# Patient Record
Sex: FEMALE | Race: WHITE | NOT HISPANIC OR LATINO | Employment: FULL TIME | URBAN - METROPOLITAN AREA
[De-identification: names, ages, dates, MRNs, and addresses within clinical notes are randomized per-mention and may not be internally consistent; named-entity substitution may affect disease eponyms.]

---

## 2017-02-18 ENCOUNTER — HOSPITAL ENCOUNTER (EMERGENCY)
Facility: HOSPITAL | Age: 52
Discharge: HOME | End: 2017-02-19
Attending: EMERGENCY MEDICINE
Payer: COMMERCIAL

## 2017-02-18 DIAGNOSIS — S91.109A OPEN WOUND OF TOE, INITIAL ENCOUNTER: Primary | ICD-10-CM

## 2017-02-18 DIAGNOSIS — L97.509 TYPE 2 DIABETES MELLITUS WITH FOOT ULCER, WITHOUT LONG-TERM CURRENT USE OF INSULIN: ICD-10-CM

## 2017-02-18 DIAGNOSIS — E11.621 TYPE 2 DIABETES MELLITUS WITH FOOT ULCER, WITHOUT LONG-TERM CURRENT USE OF INSULIN: ICD-10-CM

## 2017-02-18 PROCEDURE — 82962 GLUCOSE BLOOD TEST: CPT

## 2017-02-18 PROCEDURE — 99284 EMERGENCY DEPT VISIT MOD MDM: CPT | Mod: 25

## 2017-02-18 PROCEDURE — 25000003 PHARM REV CODE 250: Performed by: EMERGENCY MEDICINE

## 2017-02-18 RX ORDER — MUPIROCIN 20 MG/G
OINTMENT TOPICAL 3 TIMES DAILY
Qty: 30 G | Refills: 0 | Status: SHIPPED | OUTPATIENT
Start: 2017-02-18 | End: 2017-02-28

## 2017-02-18 RX ORDER — AMOXICILLIN AND CLAVULANATE POTASSIUM 875; 125 MG/1; MG/1
1 TABLET, FILM COATED ORAL 2 TIMES DAILY
Qty: 14 TABLET | Refills: 0 | Status: SHIPPED | OUTPATIENT
Start: 2017-02-18 | End: 2017-02-28

## 2017-02-18 RX ADMIN — BACITRACIN, NEOMYCIN, POLYMYXIN B 1 EACH: 400; 3.5; 5 OINTMENT TOPICAL at 11:02

## 2017-02-18 NOTE — ED AVS SNAPSHOT
[facility]  [address] [place] [address]               Jamila Xiong   2017 10:30 PM   ED    Description:  Female : 1965   Department:  [facility]           Your Care was Coordinated By:     Provider Role From To    Odilia Shankar MD Attending Provider 17 [id number] --      Reason for Visit     Foot Pain           Diagnoses this Visit        Comments    Open wound of toe, initial encounter    -  Primary     Type 2 diabetes mellitus with foot ulcer, without long-term current use of insulin           ED Disposition     ED Disposition Condition Comment    Discharge             To Do List           Follow-up Information     Follow up with David Webb DPM In 3 days.    Specialty:  Podiatry    Contact information:    [address] [place] [address]  [phone]        [facility]     [facility] -  Assistance  Registered nurses in the [facility] provide clinical advisement, health education, appointment booking, and other advisory services.  Call for this free service at [phone].             Medications           Message regarding Medications     Verify the changes and/or additions to your medication regime listed below are the same as discussed with your clinician today.  If any of these changes or additions are incorrect, please notify your healthcare provider.        These medications were administered today        Dose Freq    neomycin-bacitracnZn-polymyxnB packet 1 each 1 packet ED 1 Time    Sig: Apply 1 each topically ED 1 Time.    Class: Normal    Route: Topical (Top)           Verify that the below list of medications is an accurate representation of the medications you are currently taking.  If none reported, the list may be blank. If incorrect, please contact your healthcare provider. Carry this list with you in case of emergency.           Current Medications     atenolol  "(TENORMIN) 50 MG tablet Take 50 mg by mouth once daily.    glipiZIDE (GLUCOTROL) 5 MG TR24 TK 1 T PO  D    LYRICA 200 mg Cap TK ONE C PO  D    pravastatin (PRAVACHOL) 80 MG tablet TK 1 T PO QD    triamterene-hydrochlorothiazide 75-50 mg (MAXZIDE) 75-50 mg per tablet            Clinical Reference Information           Your Vitals Were     BP Pulse Temp Resp Height Weight    183/78 (BP Location: Right arm, Patient Position: Sitting) 77 98.7 °F (37.1 °C) (Oral) 20 5' 6.5" (1.689 m) 90.7 kg (200 lb)    Last Period SpO2 BMI          02/06/2013 100% 31.8 kg/m2        Allergies as of 2/18/2017     No Known Allergies      Immunizations Administered on Date of Encounter - 2/18/2017     None      ED Micro, Lab, POCT     Start Ordered       Status Ordering Provider    02/18/17 2239 02/18/17 2238  POCT glucose  Once      Acknowledged     02/18/17 2232 02/18/17 2231  POCT glucose  Once      Acknowledged       ED Imaging Orders     Start Ordered       Status Ordering Provider    02/18/17 2239 02/18/17 2238  X-Ray Toe 2 or more views  1 time imaging      In process         Discharge Instructions       Return for worsening wound, foot or toe swelling or pain, fever or further concerns.  Followup with podiatrist this week, preferably on Monday or Tuesday.  Stay off your foot and elevate.      Discharge References/Attachments     DIABETES: KEEPING FEET HEALTHY (ENGLISH)    DIABETIC FOOT ULCERS, DISCHARGE INSTRUCTIONS (ENGLISH)    INSPECTING YOUR FEET (DIABETES), STEP-BY-STEP (ENGLISH)       [facility] complies with applicable Federal civil rights laws and does not discriminate on the basis of race, color, national origin, age, disability, or sex.        Language Assistance Services     ATTENTION: Language assistance services are available, free of charge. Please call [phone].      ATENCIÓN: Si habla chelsi, tiene a portillo disposición servicios gratuitos de asistencia lingüística. Llame al " [phone].     CAMRON Ý: N?u b?n nói Ti?ng Vi?t, có các d?ch v? h? tr? ngôn ng? mi?n phí dành cho b?n. G?i s? [phone].

## 2017-02-19 VITALS
TEMPERATURE: 99 F | DIASTOLIC BLOOD PRESSURE: 78 MMHG | RESPIRATION RATE: 20 BRPM | HEART RATE: 77 BPM | HEIGHT: 67 IN | BODY MASS INDEX: 31.39 KG/M2 | OXYGEN SATURATION: 100 % | SYSTOLIC BLOOD PRESSURE: 183 MMHG | WEIGHT: 200 LBS

## 2017-02-19 NOTE — DISCHARGE INSTRUCTIONS
Return for worsening wound, foot or toe swelling or pain, fever or further concerns.  Followup with podiatrist this week, preferably on Monday or Tuesday.  Stay off your foot and elevate.

## 2017-02-19 NOTE — ED PROVIDER NOTES
"Encounter Date: 2017       History     Chief Complaint   Patient presents with    Foot Pain     reports "sore" to 2nd toe on right foot for one day     Review of patient's allergies indicates:  No Known Allergies  HPI Comments: This is a 50 y/o F with history of NIDDM who presents to the ED c/o bleeding from the plantar surface of the 2nd toe since this evening.  Reports she was on her feet all day today and noted blood when she removed her tennis shoes.  Denies pain in the area but has history of diabetic neuropathy and does not have good sensation of some of her toes.  No fever or chills. Compliant with meds.  Denies recent illness or hyperglycemia. Had visit with internist within the past week and foot exam was done, everything was ok at the time.     Past Medical History   Diagnosis Date    Abnormal Pap smear     Hypertension      Past Medical History Pertinent Negatives   Diagnosis Date Noted    Herpes simplex without mention of complication 2013     Past Surgical History   Procedure Laterality Date     section      Tubal ligation      Dilation and curettage of uterus       Family History   Problem Relation Age of Onset    Diabetes Father     Hypertension Father     Diabetes Mother     Hypertension Mother     Ovarian cancer Mother     Cancer Mother     Hypertension Brother     Cancer Sister      skin     Social History   Substance Use Topics    Smoking status: Never Smoker    Smokeless tobacco: Not on file    Alcohol use Yes      Comment: socially     Review of Systems   Constitutional: Negative for fever.   HENT: Negative for sore throat.    Respiratory: Negative for shortness of breath.    Cardiovascular: Negative for chest pain.   Gastrointestinal: Negative for nausea.   Genitourinary: Negative for dysuria.   Musculoskeletal: Negative for back pain.   Skin: Positive for wound. Negative for rash.   Neurological: Negative for weakness.   Hematological: Does not bruise/bleed " easily.   All other systems reviewed and are negative.      Physical Exam   Initial Vitals   BP Pulse Resp Temp SpO2   02/18/17 2229 02/18/17 2229 02/18/17 2229 02/18/17 2229 02/18/17 2229   183/78 77 20 98.7 °F (37.1 °C) 100 %     Physical Exam    Nursing note and vitals reviewed.  Constitutional: She appears well-developed and well-nourished.   HENT:   Head: Normocephalic and atraumatic.   Eyes: Conjunctivae and EOM are normal. Pupils are equal, round, and reactive to light. Right eye exhibits no discharge. Left eye exhibits no discharge. No scleral icterus.   Neck: Normal range of motion. Neck supple.   Cardiovascular: Normal rate, regular rhythm and normal heart sounds. Exam reveals no gallop and no friction rub.    No murmur heard.  Pulses:       Dorsalis pedis pulses are 2+ on the right side, and 2+ on the left side.        Posterior tibial pulses are 2+ on the left side.   Pulmonary/Chest: Breath sounds normal. No stridor. No respiratory distress. She has no wheezes. She has no rhonchi. She has no rales.   Abdominal: Soft. Bowel sounds are normal. She exhibits no distension and no mass. There is no tenderness. There is no rebound and no guarding.   Musculoskeletal:        Right ankle: Normal. Normal range of motion. Ankle sensation: normal.        Left ankle: Normal. Normal range of motion. Ankle sensation: normal.        Right foot: Right 2nd toe: Exhibits bleeding and ecchymosis. Plantar foot sensation: decreased. Comments: Equally warm both sides.      Plantar aspect of R 2nd digit with 1 cm round skin breakdown with ulceration with surrounding ecchymosis         Left foot: Plantar foot sensation: decreased.   Neurological: She is alert and oriented to person, place, and time. She has normal strength. No cranial nerve deficit or sensory deficit.   Skin: Skin is warm and dry.   Psychiatric: She has a normal mood and affect. Her behavior is normal. Judgment and thought content normal.         ED Course    Procedures  Labs Reviewed   POCT GLUCOSE MONITORING CONTINUOUS   POCT GLUCOSE MONITORING CONTINUOUS             Medical Decision Making:   Initial Assessment:   2nd digit skin breakdown in diabetic with neuropathy.   Will xray r/o fracture and check blood sugar.  No evidence of infection, not swollen, no erythema.  Brisk capillary refill and no leg swelling, pulses equal and foot is well perfused.  If xray negative will place on abx and refer for outpatient podiatry within a few days.                    ED Course     Clinical Impression:   The primary encounter diagnosis was Open wound of toe, initial encounter. A diagnosis of Type 2 diabetes mellitus with foot ulcer, without long-term current use of insulin was also pertinent to this visit.    Disposition:   Disposition: Discharged  Condition: Stable       Odilia Shankar MD  02/19/17 0148

## 2017-02-19 NOTE — ED TRIAGE NOTES
Pt presents to ED c/o right foot, second toe ulcer that she noticed approx 30 minutes ago. States she walked a lot today, wearing her regular tennis shoes and does not remember injuring toe or hitting/stubbing it any where. Pt just noticed bleeding when taking her shoe off. On arrival, small open ulcer to right second toe with bruising to toe. Pt has numbness with decreased sensation to second and third toe. Hx of diabetes and hx of neuropathy.

## 2017-02-20 ENCOUNTER — TELEPHONE (OUTPATIENT)
Dept: PODIATRY | Facility: CLINIC | Age: 52
End: 2017-02-20

## 2017-02-20 LAB — POCT GLUCOSE: 170 MG/DL (ref 70–110)

## 2017-02-20 NOTE — TELEPHONE ENCOUNTER
----- Message from Samson Garrett sent at 2/20/2017  1:52 PM CST -----  Contact: self   Pt was seen in the ER on Saturday 2-18-17 for a sore on her foot, she was told to f/u Dr. Webb  with Podiatry within 3 days to a week.  Pt has a sore on her foot ans she's also type 2 diabetic.  Dr Webb is booked until March 10th.  Can you please call pt back to discuss adding her on [phone]

## 2017-02-24 ENCOUNTER — OFFICE VISIT (OUTPATIENT)
Dept: PODIATRY | Facility: CLINIC | Age: 52
End: 2017-02-24
Payer: COMMERCIAL

## 2017-02-24 VITALS
BODY MASS INDEX: 31.86 KG/M2 | WEIGHT: 203 LBS | DIASTOLIC BLOOD PRESSURE: 76 MMHG | SYSTOLIC BLOOD PRESSURE: 134 MMHG | HEART RATE: 67 BPM | HEIGHT: 67 IN | RESPIRATION RATE: 18 BRPM | TEMPERATURE: 98 F

## 2017-02-24 DIAGNOSIS — E11.42 DIABETIC POLYNEUROPATHY ASSOCIATED WITH TYPE 2 DIABETES MELLITUS: ICD-10-CM

## 2017-02-24 DIAGNOSIS — L97.512 TOE ULCER, RIGHT, WITH FAT LAYER EXPOSED: ICD-10-CM

## 2017-02-24 DIAGNOSIS — E11.49 TYPE II DIABETES MELLITUS WITH NEUROLOGICAL MANIFESTATIONS: Primary | ICD-10-CM

## 2017-02-24 DIAGNOSIS — B35.3 TINEA PEDIS OF BOTH FEET: ICD-10-CM

## 2017-02-24 PROCEDURE — 3060F POS MICROALBUMINURIA REV: CPT | Mod: S$GLB,,, | Performed by: PODIATRIST

## 2017-02-24 PROCEDURE — 3046F HEMOGLOBIN A1C LEVEL >9.0%: CPT | Mod: S$GLB,,, | Performed by: PODIATRIST

## 2017-02-24 PROCEDURE — 2022F DILAT RTA XM EVC RTNOPTHY: CPT | Mod: S$GLB,,, | Performed by: PODIATRIST

## 2017-02-24 PROCEDURE — 1160F RVW MEDS BY RX/DR IN RCRD: CPT | Mod: S$GLB,,, | Performed by: PODIATRIST

## 2017-02-24 PROCEDURE — 11042 DBRDMT SUBQ TIS 1ST 20SQCM/<: CPT | Mod: S$GLB,,, | Performed by: PODIATRIST

## 2017-02-24 PROCEDURE — 99999 PR PBB SHADOW E&M-EST. PATIENT-LVL III: CPT | Mod: PBBFAC,,, | Performed by: PODIATRIST

## 2017-02-24 PROCEDURE — 99204 OFFICE O/P NEW MOD 45 MIN: CPT | Mod: 25,S$GLB,, | Performed by: PODIATRIST

## 2017-02-24 RX ORDER — ECONAZOLE NITRATE 10 MG/G
CREAM TOPICAL 2 TIMES DAILY
Qty: 85 G | Refills: 1 | Status: SHIPPED | OUTPATIENT
Start: 2017-02-24 | End: 2019-10-07

## 2017-02-24 RX ORDER — EZETIMIBE 10 MG/1
TABLET ORAL
Refills: 0 | COMMUNITY
Start: 2017-02-16 | End: 2019-04-09

## 2017-02-24 NOTE — PROGRESS NOTES
"Subjective:      Patient ID: Jamila Xiong is a 51 y.o. female.    Chief Complaint: PCP (KATIA BERNAL 2/17); Diabetic Foot Exam; and Foot Problem (Rt 2nd toe blister )    Jamila is a 51 y.o. female who presents to the clinic for evaluation and treatment of high risk feet. Jamila has a past medical history of Abnormal Pap smear and Hypertension. The patient's chief complaint is diabetic foot ulcer, right 2nd toe / this began after working her normal shift at [employer] she remove dher sock and noted a bleeding blister . This patient has documented high risk feet requiring routine maintenance secondary to diabetes mellitis and those secondary complications of diabetes, as mentioned..  History of Trauma: negative  Sign of Infection: none    No results found for: HGBA1C      Review of Systems   Constitution: Negative for chills, decreased appetite and fever.   Cardiovascular: Negative for leg swelling.   Musculoskeletal: Negative for arthritis, joint pain, joint swelling and myalgias.   Gastrointestinal: Negative for nausea and vomiting.   Neurological: Negative for loss of balance, numbness and paresthesias.           Objective:       Vitals:    02/24/17 0901   BP: 134/76   Pulse: 67   Resp: 18   Temp: 97.5 °F (36.4 °C)   TempSrc: Oral   Weight: 92.1 kg (203 lb)   Height: 5' 6.5" (1.689 m)   PainSc: 0-No pain        Physical Exam   Constitutional: She is oriented to person, place, and time. She appears well-developed and well-nourished.   Cardiovascular:   Pulses:       Dorsalis pedis pulses are 2+ on the right side, and 2+ on the left side.        Posterior tibial pulses are 2+ on the right side, and 2+ on the left side.   Musculoskeletal: She exhibits no edema or tenderness.        Right ankle: Normal.        Left ankle: Normal.        Right foot: There is no swelling, no crepitus and no deformity.        Left foot: There is no swelling, no crepitus and no deformity.   Adequate joint range of motion without pain, " limitation, nor crepitation Bilateral feet and ankle joints. Muscle strength is 5/5 in all groups bilaterally.         Feet:   Right Foot:   Protective Sensation: 10 sites tested. 9 sites sensed.   Left Foot:   Protective Sensation: 10 sites tested. 9 sites sensed.   Lymphadenopathy:   No palpable lymph nodes   Neurological: She is alert and oriented to person, place, and time. She has normal strength.   Skin: Skin is warm and dry. No rash noted. No erythema. Nails show no clubbing.   Ulcer location: plantar R 2nd toe   Measurements: 0.8x0.8x0.7 cm   Signs of infection: none  Drainage: none  Periwound: intact  Base: granular/fibrotic 80/20     Psychiatric: She has a normal mood and affect. Her behavior is normal.   Nursing note and vitals reviewed.            Assessment:       Encounter Diagnoses   Name Primary?    Type II diabetes mellitus with neurological manifestations Yes    Toe ulcer, right, with fat layer exposed     Diabetic polyneuropathy associated with type 2 diabetes mellitus     Tinea pedis of both feet          Plan:       Jamila was seen today for pcp, diabetic foot exam and foot problem.    Diagnoses and all orders for this visit:    Type II diabetes mellitus with neurological manifestations    Toe ulcer, right, with fat layer exposed    Diabetic polyneuropathy associated with type 2 diabetes mellitus    Tinea pedis of both feet    Other orders  -     econazole nitrate 1 % cream; Apply topically 2 (two) times daily.      I counseled the patient on her conditions, their implications and medical management.    Shoe inspection. Diabetic Foot Education. Patient reminded of the importance of good nutrition and blood sugar control to help prevent podiatric complications of diabetes. Patient instructed on proper foot hygeine. We discussed wearing proper shoe gear, daily foot inspections, never walking without protective shoe gear, never putting sharp instruments to feet    Econazole 1% topical cream  prescribed for treatment of aforementioned tinea pedis. Patient will use this medication as directed in addition to thourougly drying between toes daily, and applying powder as needed    Advised the patient that fungus likes warm, dark, and moist environments such as bathrooms, gyms, and pools. Advised to spray shower, shower mat , and any shoes w/ Lysol periodically    Wound 2/2 blister formation, will initiate football therapy            WOUND DEBRIDEMENT OPERATIVE REPORT     SURGEON: Sammie Estes DPM     PRE-OP DIAGNOSIS: DIABETIC NEUROTROPHIC FOOT ULCER right      POST-OP DIAGNOSIS: SAME     PROCEDURE: EXCISIONAL ULCER DEBRIDEMENT THROUGH SKIN AND SUBCUTANEOUS TISSUE    ANESTHESIA: Absent protective threshold therefore none required.     EBL: minimal     PROCEDURE IN DETAIL:    After obtaining verbal consent a Time out was performed to verify patient and site, the surgical site was prepped with alcohol prep.     Attention was directed to the r foot where an ulceration was noted on plantar 2nd toe.     Utilizing a No 15 scalpel , I debrided the wound full-thickness through skin, subcutaneous tissue excise viable and non-viable tissue outside the wound margins.    Tissue removed included skin, subcutaneous tissue, callus. Post-debridement, the wound was 100% beefy red  granulation tissue, mildly bleeding, without callus nor fibrin noted. Bleeding was well-controlled with direct pressure. Patient procedure tolerated well.    Pre debridement measurements : 0.8x0.7x0.1 cm   Post debridement measurements: 0.8x0.8x0.7 cm      Dressings:tacho   Offloading:kylie Jones MA assisted w/ application of football dressing under my direct supervision. Darco shoe applied to offload the area. Advised patient that this should be worn on the affected foot at all times when ambulating       Follow-up:Patient is to return to the clinic in one week for follow-up but should call [facility] immediately if any signs of  infection, such as fever, chills, sweats, increased redness or pain.    Short-term goals include maintaining good offloading and minimizing bioburden, promoting granulation and epithelialization to healing.  Long-term goals include keeping the wound healed by good offloading and medical management under the direction of internist.          .

## 2017-02-24 NOTE — LETTER
February 24, 2017      Odilia Shankar MD  [address] [facility] [address]           [facility] - Podiatry  [address]  [place] [address]  Phone: [phone]          Patient: Jamila Xiong   MR Number: [id number]   YOB: 1965   Date of Visit: 2/24/2017       Dear Dr. Odilia Shankar:    Thank you for referring Jamila Xiong to me for evaluation. Attached you will find relevant portions of my assessment and plan of care.    If you have questions, please do not hesitate to call me. I look forward to following Jamila Xiong along with you.    Sincerely,    Sammie Hickman, JENNIFER    Enclosure  CC:  No Recipients    If you would like to receive this communication electronically, please contact [email or web address] or [phone] to request more information on [employer] access.    For providers and/or their staff who would like to refer a patient to [facility], please contact us through our one-stop-shop provider referral line, [facility], at [phone].    If you feel you have received this communication in error or would no longer like to receive these types of communications, please e-mail [email or web address]

## 2017-02-24 NOTE — LETTER
February 24, 2017      Jamila Xiong  [address]  [place] [address]             [facility] - Podiatry  [address]  [facility] [address]  Phone: [phone] Patient: Jamila Xiong   MRN: [id number]  YOB: 1965  Date of Visit: 02/24/2017    To Whom It May Concern:    Jamila was seen in the Podiatry Clinic on 02/24/2017. Due to a right foot condition she must remain off her feet until further notice. She will remain out of work until resolution of her acute R foot condition.  If you have any questions or concerns, or if I can be of further assistance, please do not hesitate to contact my office.    Sincerely,          Sammie Hickman DPM  Podiatry Clinic

## 2017-03-02 ENCOUNTER — OFFICE VISIT (OUTPATIENT)
Dept: PODIATRY | Facility: CLINIC | Age: 52
End: 2017-03-02
Payer: COMMERCIAL

## 2017-03-02 VITALS
WEIGHT: 203 LBS | RESPIRATION RATE: 18 BRPM | HEIGHT: 67 IN | HEART RATE: 63 BPM | TEMPERATURE: 98 F | SYSTOLIC BLOOD PRESSURE: 151 MMHG | DIASTOLIC BLOOD PRESSURE: 87 MMHG | BODY MASS INDEX: 31.86 KG/M2

## 2017-03-02 DIAGNOSIS — E11.49 TYPE II DIABETES MELLITUS WITH NEUROLOGICAL MANIFESTATIONS: Primary | ICD-10-CM

## 2017-03-02 DIAGNOSIS — L97.512 TOE ULCER, RIGHT, WITH FAT LAYER EXPOSED: ICD-10-CM

## 2017-03-02 PROCEDURE — 1160F RVW MEDS BY RX/DR IN RCRD: CPT | Mod: S$GLB,,, | Performed by: PODIATRIST

## 2017-03-02 PROCEDURE — 3046F HEMOGLOBIN A1C LEVEL >9.0%: CPT | Mod: S$GLB,,, | Performed by: PODIATRIST

## 2017-03-02 PROCEDURE — 3060F POS MICROALBUMINURIA REV: CPT | Mod: S$GLB,,, | Performed by: PODIATRIST

## 2017-03-02 PROCEDURE — 2022F DILAT RTA XM EVC RTNOPTHY: CPT | Mod: S$GLB,,, | Performed by: PODIATRIST

## 2017-03-02 PROCEDURE — 99999 PR PBB SHADOW E&M-EST. PATIENT-LVL III: CPT | Mod: PBBFAC,,, | Performed by: PODIATRIST

## 2017-03-02 PROCEDURE — 99213 OFFICE O/P EST LOW 20 MIN: CPT | Mod: S$GLB,,, | Performed by: PODIATRIST

## 2017-03-02 NOTE — LETTER
March 2, 2017      Jamila Xiong  [address]  [place] [address]             [facility] - Podiatry  [address]  [place] [address]  Phone: [phone] Patient: Jamila Xiong   MRN: [id number]  YOB: 1965  Date of Visit: 03/02/2017    To Whom It May Concern:    Jamila was seen in the Podiatry Clinic on 03/2/2017. Due to a right foot condition she must remain off her feet until further notice. She will remain out of work until resolution of her acute R foot condition.  If you have any questions or concerns, or if I can be of further assistance, please do not hesitate to contact my office.      Sincerely,          Sammie Hickman DPM  Podiatry Clinic  [facility]

## 2017-03-02 NOTE — PROGRESS NOTES
"Subjective:      Patient ID: Jamila Xiong is a 51 y.o. female.    Chief Complaint: Follow-up (toe wound ); Foot Ulcer; and Dressing Change    Jamila is a 51 y.o. female who presents to the clinic for evaluation and treatment of high risk feet. Jamila has a past medical history of Abnormal Pap smear and Hypertension. The patient's chief complaint is diabetic foot ulcer, right 2nd toe / this began after working her normal shift at [employer] she remove dher sock and noted a bleeding blister . This patient has documented high risk feet requiring routine maintenance secondary to diabetes mellitis and those secondary complications of diabetes, as mentioned..    3-2-17: Patient has been in football dressing, ambulating in Darco shoe x 1 week with out issues     History of Trauma: negative  Sign of Infection: none    No results found for: HGBA1C      Review of Systems   Constitution: Negative for chills, decreased appetite and fever.   Cardiovascular: Negative for leg swelling.   Musculoskeletal: Negative for arthritis, joint pain, joint swelling and myalgias.   Gastrointestinal: Negative for nausea and vomiting.   Neurological: Negative for loss of balance, numbness and paresthesias.           Objective:       Vitals:    03/02/17 1137   BP: (!) 151/87   Pulse: 63   Resp: 18   Temp: 97.9 °F (36.6 °C)   TempSrc: Oral   Weight: 92.1 kg (203 lb)   Height: 5' 6.5" (1.689 m)   PainSc: 0-No pain        Physical Exam   Constitutional: She is oriented to person, place, and time. She appears well-developed and well-nourished.   Cardiovascular:   Pulses:       Dorsalis pedis pulses are 2+ on the right side, and 2+ on the left side.        Posterior tibial pulses are 2+ on the right side, and 2+ on the left side.   Musculoskeletal: She exhibits no edema or tenderness.        Right foot: There is no deformity.        Left foot: There is no deformity.   Adequate joint range of motion without pain, limitation, nor crepitation " Bilateral feet and ankle joints. Muscle strength is 5/5 in all groups bilaterally.         Feet:   Right Foot:   Protective Sensation: 10 sites tested. 9 sites sensed.   Left Foot:   Protective Sensation: 10 sites tested. 9 sites sensed.   Lymphadenopathy:   No palpable lymph nodes   Neurological: She is alert and oriented to person, place, and time. She has normal strength.   Skin: Skin is warm and dry. No rash noted. No erythema. Nails show no clubbing.   Ulcer location: plantar R 2nd toe   Measurements: 0.5x0.4x0.2 cm   Signs of infection: none  Drainage: none  Periwound: intact  Base: granular     Psychiatric: She has a normal mood and affect. Her behavior is normal.   Nursing note and vitals reviewed.            Assessment:       Encounter Diagnoses   Name Primary?    Type II diabetes mellitus with neurological manifestations Yes    Toe ulcer, right, with fat layer exposed          Plan:       Jamila was seen today for follow-up, foot ulcer and dressing change.    Diagnoses and all orders for this visit:    Type II diabetes mellitus with neurological manifestations    Toe ulcer, right, with fat layer exposed      I counseled the patient on her conditions, their implications and medical management.    Debridement:area deeply cleansed with saline moistened gauze     Dressings:tacho  Offloading:kylie Jones MA assisted w/ application of football dressing under my direct supervision. Darco shoe applied to offload the area. Advised patient that this should be worn on the affected foot at all times when ambulating       Follow-up:Patient is to return to the clinic in one week for follow-up but should call [facility] immediately if any signs of infection, such as fever, chills, sweats, increased redness or pain.    Short-term goals include maintaining good offloading and minimizing bioburden, promoting granulation and epithelialization to healing.  Long-term goals include keeping the wound healed by good  offloading and medical management under the direction of internist.

## 2017-03-09 ENCOUNTER — OFFICE VISIT (OUTPATIENT)
Dept: PODIATRY | Facility: CLINIC | Age: 52
End: 2017-03-09
Payer: COMMERCIAL

## 2017-03-09 VITALS
BODY MASS INDEX: 31.71 KG/M2 | WEIGHT: 202 LBS | HEART RATE: 69 BPM | DIASTOLIC BLOOD PRESSURE: 87 MMHG | HEIGHT: 67 IN | SYSTOLIC BLOOD PRESSURE: 144 MMHG | RESPIRATION RATE: 18 BRPM

## 2017-03-09 DIAGNOSIS — L97.512 TOE ULCER, RIGHT, WITH FAT LAYER EXPOSED: ICD-10-CM

## 2017-03-09 DIAGNOSIS — E11.49 TYPE II DIABETES MELLITUS WITH NEUROLOGICAL MANIFESTATIONS: Primary | ICD-10-CM

## 2017-03-09 PROCEDURE — 99999 PR PBB SHADOW E&M-EST. PATIENT-LVL III: CPT | Mod: PBBFAC,,, | Performed by: PODIATRIST

## 2017-03-09 PROCEDURE — 3060F POS MICROALBUMINURIA REV: CPT | Mod: S$GLB,,, | Performed by: PODIATRIST

## 2017-03-09 PROCEDURE — 3046F HEMOGLOBIN A1C LEVEL >9.0%: CPT | Mod: S$GLB,,, | Performed by: PODIATRIST

## 2017-03-09 PROCEDURE — 1160F RVW MEDS BY RX/DR IN RCRD: CPT | Mod: S$GLB,,, | Performed by: PODIATRIST

## 2017-03-09 PROCEDURE — 99213 OFFICE O/P EST LOW 20 MIN: CPT | Mod: S$GLB,,, | Performed by: PODIATRIST

## 2017-03-09 PROCEDURE — 2022F DILAT RTA XM EVC RTNOPTHY: CPT | Mod: S$GLB,,, | Performed by: PODIATRIST

## 2017-03-09 NOTE — LETTER
March 9, 2017      Jamila Xiong  [address]  [place] [address]             [facility] - Podiatry  [address]  [place] [address]  Phone: [phone] Patient: Jamila Xiong   MRN: [id number]  YOB: 1965  Date of Visit: 03/09/2017    To Whom It May Concern:      Jamila was seen in the Podiatry Clinic on 03/9/2017. Due to a right foot condition she must remain off her feet until further notice. She will remain out of work until resolution of her acute R foot condition.  If you have any questions or concerns, or if I can be of further assistance, please do not hesitate to contact my office.    Sincerely,          Sammie Hickman DPM  Podiatry Clinic  [facility]

## 2017-03-09 NOTE — PROGRESS NOTES
"Subjective:      Patient ID: Jamila Xiong is a 51 y.o. female.    Chief Complaint: Follow-up (wound care toe ); Foot Ulcer (toe ); and Dressing Change    Jamila is a 51 y.o. female who presents to the clinic for evaluation and treatment of high risk feet. Jamila has a past medical history of Abnormal Pap smear and Hypertension. The patient's chief complaint is diabetic foot ulcer, right 2nd toe / this began after working her normal shift at [employer] she remove dher sock and noted a bleeding blister . This patient has documented high risk feet requiring routine maintenance secondary to diabetes mellitis and those secondary complications of diabetes, as mentioned..    3-9-17: Patient has been in football dressing, ambulating in Darco shoe x 1 week with out issues     History of Trauma: negative  Sign of Infection: none    No results found for: HGBA1C      Review of Systems   Constitution: Negative for chills, decreased appetite and fever.   Cardiovascular: Negative for leg swelling.   Skin: Positive for dry skin.   Musculoskeletal: Negative for arthritis, joint pain, joint swelling and myalgias.   Gastrointestinal: Negative for nausea and vomiting.   Neurological: Negative for loss of balance, numbness and paresthesias.           Objective:       Vitals:    03/09/17 1149   BP: (!) 144/87   Pulse: 69   Resp: 18   Weight: 91.6 kg (202 lb)   Height: 5' 6.5" (1.689 m)   PainSc:   2   PainLoc: Toe        Physical Exam   Constitutional: She is oriented to person, place, and time. She appears well-developed and well-nourished.   Cardiovascular:   Pulses:       Dorsalis pedis pulses are 2+ on the right side, and 2+ on the left side.        Posterior tibial pulses are 2+ on the right side, and 2+ on the left side.   Musculoskeletal: She exhibits no edema or tenderness.        Right foot: There is no deformity.        Left foot: There is no deformity.   Adequate joint range of motion without pain, limitation, nor " crepitation Bilateral feet and ankle joints. Muscle strength is 5/5 in all groups bilaterally.         Feet:   Right Foot:   Protective Sensation: 10 sites tested. 9 sites sensed.   Left Foot:   Protective Sensation: 10 sites tested. 9 sites sensed.   Lymphadenopathy:   No palpable lymph nodes   Neurological: She is alert and oriented to person, place, and time. She has normal strength.   Skin: Skin is warm and dry. No rash noted. No erythema. Nails show no clubbing.   Ulcer location: plantar R 2nd toe   Measurements: 0.2x0.4x0.1 cm   Signs of infection: none  Drainage: none  Periwound: intact  Base: granular     Psychiatric: She has a normal mood and affect. Her behavior is normal.   Nursing note and vitals reviewed.            Assessment:       Encounter Diagnoses   Name Primary?    Type II diabetes mellitus with neurological manifestations Yes    Toe ulcer, right, with fat layer exposed          Plan:       Jamila was seen today for follow-up, foot ulcer and dressing change.    Diagnoses and all orders for this visit:    Type II diabetes mellitus with neurological manifestations    Toe ulcer, right, with fat layer exposed    I counseled the patient on her conditions, their implications and medical management.    Wound progressing well , will continue current treatment plan    Debridement:area deeply cleansed with saline moistened gauze     Dressings:tacho  Offloading:kylie Jones MA assisted w/ application of football dressing under my direct supervision. Darco shoe applied to offload the area. Advised patient that this should be worn on the affected foot at all times when ambulating       Follow-up:Patient is to return to the clinic in one week for follow-up but [facility] if any signs of infection, such as fever, chills, sweats, increased redness or pain.    Short-term goals include maintaining good offloading and minimizing bioburden, promoting granulation and epithelialization  to healing.  Long-term goals include keeping the wound healed by good offloading and medical management under the direction of internist.

## 2017-03-16 ENCOUNTER — OFFICE VISIT (OUTPATIENT)
Dept: PODIATRY | Facility: CLINIC | Age: 52
End: 2017-03-16
Payer: COMMERCIAL

## 2017-03-16 VITALS
HEART RATE: 67 BPM | DIASTOLIC BLOOD PRESSURE: 84 MMHG | BODY MASS INDEX: 31.71 KG/M2 | RESPIRATION RATE: 18 BRPM | WEIGHT: 202 LBS | SYSTOLIC BLOOD PRESSURE: 151 MMHG | HEIGHT: 67 IN

## 2017-03-16 DIAGNOSIS — Z87.2 HEALED FOOT ULCER: ICD-10-CM

## 2017-03-16 DIAGNOSIS — E11.49 TYPE II DIABETES MELLITUS WITH NEUROLOGICAL MANIFESTATIONS: Primary | ICD-10-CM

## 2017-03-16 PROCEDURE — 2022F DILAT RTA XM EVC RTNOPTHY: CPT | Mod: S$GLB,,, | Performed by: PODIATRIST

## 2017-03-16 PROCEDURE — 99212 OFFICE O/P EST SF 10 MIN: CPT | Mod: S$GLB,,, | Performed by: PODIATRIST

## 2017-03-16 PROCEDURE — 1160F RVW MEDS BY RX/DR IN RCRD: CPT | Mod: S$GLB,,, | Performed by: PODIATRIST

## 2017-03-16 PROCEDURE — 3046F HEMOGLOBIN A1C LEVEL >9.0%: CPT | Mod: S$GLB,,, | Performed by: PODIATRIST

## 2017-03-16 PROCEDURE — 3060F POS MICROALBUMINURIA REV: CPT | Mod: S$GLB,,, | Performed by: PODIATRIST

## 2017-03-16 PROCEDURE — 99999 PR PBB SHADOW E&M-EST. PATIENT-LVL III: CPT | Mod: PBBFAC,,, | Performed by: PODIATRIST

## 2017-03-16 NOTE — PROGRESS NOTES
"Subjective:      Patient ID: Jamila Xiong is a 51 y.o. female.    Chief Complaint: Follow-up (wound check); Foot Ulcer; and foot ball    Jamila is a 51 y.o. female who presents to the clinic for evaluation and treatment of high risk feet. Jamila has a past medical history of Abnormal Pap smear and Hypertension. The patient's chief complaint is diabetic foot ulcer, right 2nd toe / this began after working her normal shift at [employer] she remove dher sock and noted a bleeding blister . This patient has documented high risk feet requiring routine maintenance secondary to diabetes mellitis and those secondary complications of diabetes, as mentioned..    3-9-17: Patient has been in football dressing, ambulating in Darco shoe x 1 week with out issues   3-16-17 Patient has been in football dressing, ambulating in Darco shoe x 1 week with out issues     History of Trauma: negative  Sign of Infection: none    No results found for: HGBA1C      Review of Systems   Constitution: Negative for chills, decreased appetite and fever.   Cardiovascular: Negative for chest pain, claudication and leg swelling.   Skin: Positive for dry skin.   Musculoskeletal: Negative for arthritis, back pain, falls, joint pain, joint swelling and myalgias.   Gastrointestinal: Negative for nausea and vomiting.   Neurological: Negative for loss of balance, numbness and paresthesias.           Objective:       Vitals:    03/16/17 0915   BP: (!) 151/84   Pulse: 67   Resp: 18   Weight: 91.6 kg (202 lb)   Height: 5' 6.5" (1.689 m)   PainSc:   3   PainLoc: Foot        Physical Exam   Constitutional: She is oriented to person, place, and time. She appears well-developed and well-nourished.   Cardiovascular:   Pulses:       Dorsalis pedis pulses are 2+ on the right side, and 2+ on the left side.        Posterior tibial pulses are 2+ on the right side, and 2+ on the left side.   Musculoskeletal: She exhibits no edema or tenderness.        Right foot: " There is no deformity.        Left foot: There is no deformity.   Adequate joint range of motion without pain, limitation, nor crepitation Bilateral feet and ankle joints. Muscle strength is 5/5 in all groups bilaterally.         Feet:   Right Foot:   Protective Sensation: 10 sites tested. 9 sites sensed.   Left Foot:   Protective Sensation: 10 sites tested. 9 sites sensed.   Lymphadenopathy:   No palpable lymph nodes   Neurological: She is alert and oriented to person, place, and time. She has normal strength.   Skin: Skin is warm and dry. No rash noted. No erythema. Nails show no clubbing.   Ulcer location: plantar R 2nd toe   Measurements:[id number]   Signs of infection: none  Drainage: none  Periwound: intact  Base: epithelial      Psychiatric: She has a normal mood and affect. Her behavior is normal.   Nursing note and vitals reviewed.            Assessment:       Encounter Diagnoses   Name Primary?    Type II diabetes mellitus with neurological manifestations Yes    Healed foot ulcer          Plan:       Jamila was seen today for follow-up, foot ulcer and foot ball.    Diagnoses and all orders for this visit:    Type II diabetes mellitus with neurological manifestations    Healed foot ulcer      I counseled the patient on her conditions, their implications and medical management.    Wound has healed well with no signs of continued skin breakdown noted   Ok to transition into normal shoe gear at this time. I advised patient to check feet daily for signs of drainage or lesion re-opening   Discussed use of daily foot moisturizer to feet, avoiding the webspace's  Limit showers/bathing to roughly 15 minutes during the 1st few weeks after wound has healed to prevent skin breakdown

## 2017-04-06 ENCOUNTER — OFFICE VISIT (OUTPATIENT)
Dept: PODIATRY | Facility: CLINIC | Age: 52
End: 2017-04-06
Payer: COMMERCIAL

## 2017-04-06 VITALS
BODY MASS INDEX: 31.71 KG/M2 | RESPIRATION RATE: 18 BRPM | SYSTOLIC BLOOD PRESSURE: 135 MMHG | HEIGHT: 67 IN | HEART RATE: 62 BPM | DIASTOLIC BLOOD PRESSURE: 85 MMHG | WEIGHT: 202 LBS

## 2017-04-06 DIAGNOSIS — L97.511 TOE ULCER, RIGHT, LIMITED TO BREAKDOWN OF SKIN: ICD-10-CM

## 2017-04-06 DIAGNOSIS — E11.49 TYPE II DIABETES MELLITUS WITH NEUROLOGICAL MANIFESTATIONS: Primary | ICD-10-CM

## 2017-04-06 PROCEDURE — 3046F HEMOGLOBIN A1C LEVEL >9.0%: CPT | Mod: S$GLB,,, | Performed by: PODIATRIST

## 2017-04-06 PROCEDURE — 1160F RVW MEDS BY RX/DR IN RCRD: CPT | Mod: S$GLB,,, | Performed by: PODIATRIST

## 2017-04-06 PROCEDURE — 3060F POS MICROALBUMINURIA REV: CPT | Mod: S$GLB,,, | Performed by: PODIATRIST

## 2017-04-06 PROCEDURE — 99999 PR PBB SHADOW E&M-EST. PATIENT-LVL III: CPT | Mod: PBBFAC,,, | Performed by: PODIATRIST

## 2017-04-06 PROCEDURE — 99213 OFFICE O/P EST LOW 20 MIN: CPT | Mod: S$GLB,,, | Performed by: PODIATRIST

## 2017-04-07 NOTE — PROGRESS NOTES
"Subjective:      Patient ID: Jamila Xiong is a 51 y.o. female.    Chief Complaint: PCP (KATIA BERNAL 2/17); Diabetic Foot Exam; and Foot Ulcer (check on healed ulcer )    Jamila is a 51 y.o. female who presents to the clinic for evaluation and treatment of high risk feet. Jamila has a past medical history of Abnormal Pap smear and Hypertension. The patient's chief complaint is diabetic foot ulcer, right 2nd toe / this began after working her normal shift at [employer] she remove dher sock and noted a bleeding blister . This patient has documented high risk feet requiring routine maintenance secondary to diabetes mellitis and those secondary complications of diabetes, as mentioned..    3-9-17: Patient has been in football dressing, ambulating in Darco shoe x 1 week with out issues   3-16-17 Patient has been in football dressing, ambulating in Darco shoe x 1 week with out issues   4/6/17: pt presents for 3 week follow up. Has resumed work at her second job. Reports small area still present on the toe     History of Trauma: negative  Sign of Infection: none    No results found for: HGBA1C      Review of Systems   Constitution: Negative for chills, decreased appetite and fever.   Cardiovascular: Negative for chest pain, claudication, irregular heartbeat and leg swelling.   Skin: Negative for flushing, itching and poor wound healing.   Musculoskeletal: Negative for arthritis, back pain, falls, joint pain, joint swelling and myalgias.   Gastrointestinal: Negative for nausea and vomiting.   Neurological: Negative for loss of balance, numbness and paresthesias.           Objective:       Vitals:    04/06/17 0859   BP: 135/85   Pulse: 62   Resp: 18   Weight: 91.6 kg (202 lb)   Height: 5' 6.5" (1.689 m)   PainSc: 0-No pain        Physical Exam   Constitutional: She is oriented to person, place, and time. She appears well-developed and well-nourished.   Cardiovascular:   Pulses:       Dorsalis pedis pulses are 2+ on the " right side, and 2+ on the left side.        Posterior tibial pulses are 2+ on the right side, and 2+ on the left side.   Musculoskeletal: She exhibits no edema, tenderness or deformity.        Right foot: There is no deformity.        Left foot: There is no deformity.   Adequate joint range of motion without pain, limitation, nor crepitation Bilateral feet and ankle joints. Muscle strength is 5/5 in all groups bilaterally.         Feet:   Right Foot:   Protective Sensation: 10 sites tested. 9 sites sensed.   Left Foot:   Protective Sensation: 10 sites tested. 9 sites sensed.   Lymphadenopathy:   No palpable lymph nodes   Neurological: She is alert and oriented to person, place, and time. She has normal strength.   Skin: Skin is warm and dry. No rash noted. No erythema. Nails show no clubbing.   Ulcer location: plantar R 2nd toe   Measurements:0.1x0.1x0.1cm   Signs of infection: none  Drainage: none  Periwound: intact  Base: granular     Psychiatric: She has a normal mood and affect. Her behavior is normal.   Nursing note and vitals reviewed.            Assessment:       Encounter Diagnoses   Name Primary?    Type II diabetes mellitus with neurological manifestations Yes    Toe ulcer, right, limited to breakdown of skin          Plan:       Jamila was seen today for pcp, diabetic foot exam and foot ulcer.    Diagnoses and all orders for this visit:    Type II diabetes mellitus with neurological manifestations    Toe ulcer, right, limited to breakdown of skin      I counseled the patient on her conditions, their implications and medical management.    Small pinpoint lesion   Debridement:area deeply cleansed with saline moistened gauze     Dressings:iodosorb   Offloading:mepilex border     Follow-up:Patient is to return to the clinic in 2 weeks for follow-up but [facility] if any signs of infection, such as fever, chills, sweats, increased redness or pain.    Short-term goals include maintaining  good offloading and minimizing bioburden, promoting granulation and epithelialization to healing.  Long-term goals include keeping the wound healed by good offloading and medical management under the direction of internist.

## 2017-10-17 DIAGNOSIS — Z12.39 SCREENING BREAST EXAMINATION: Primary | ICD-10-CM

## 2017-12-12 ENCOUNTER — HOSPITAL ENCOUNTER (OUTPATIENT)
Dept: RADIOLOGY | Facility: HOSPITAL | Age: 52
Discharge: HOME | End: 2017-12-12
Attending: INTERNAL MEDICINE
Payer: COMMERCIAL

## 2017-12-12 DIAGNOSIS — Z12.39 SCREENING BREAST EXAMINATION: ICD-10-CM

## 2017-12-12 PROCEDURE — 77067 SCR MAMMO BI INCL CAD: CPT | Mod: 26,,, | Performed by: RADIOLOGY

## 2017-12-12 PROCEDURE — 77067 SCR MAMMO BI INCL CAD: CPT | Mod: TC

## 2017-12-15 ENCOUNTER — OFFICE VISIT (OUTPATIENT)
Dept: OBSTETRICS AND GYNECOLOGY | Facility: CLINIC | Age: 52
End: 2017-12-15
Payer: COMMERCIAL

## 2017-12-15 VITALS
WEIGHT: 193.13 LBS | DIASTOLIC BLOOD PRESSURE: 80 MMHG | SYSTOLIC BLOOD PRESSURE: 126 MMHG | BODY MASS INDEX: 30.31 KG/M2 | HEIGHT: 67 IN

## 2017-12-15 DIAGNOSIS — Z01.419 WELL WOMAN EXAM WITH ROUTINE GYNECOLOGICAL EXAM: ICD-10-CM

## 2017-12-15 DIAGNOSIS — Z12.4 ROUTINE PAPANICOLAOU SMEAR: ICD-10-CM

## 2017-12-15 PROCEDURE — 88175 CYTOPATH C/V AUTO FLUID REDO: CPT

## 2017-12-15 PROCEDURE — 99999 PR PBB SHADOW E&M-EST. PATIENT-LVL II: CPT | Mod: PBBFAC,,, | Performed by: OBSTETRICS & GYNECOLOGY

## 2017-12-15 PROCEDURE — 99396 PREV VISIT EST AGE 40-64: CPT | Mod: S$GLB,,, | Performed by: OBSTETRICS & GYNECOLOGY

## 2017-12-15 NOTE — PROGRESS NOTES
"HPI:   52 y.o.   OB History      Para Term  AB Living    2 2 2     2    SAB TAB Ectopic Multiple Live Births            2       Patient's last menstrual period was 2013.    Patient is  here for her annual gynecologic exam.  She has no complaints.     ROS:  GENERAL: No fever, chills, fatigability or weight loss.  SKIN: No rashes, itching or changes in color or texture of skin.  HEAD: No headaches or recent head trauma.  EYES: Visual acuity fine. No photophobia, ocular pain or diplopia.  EARS: Denies ear pain, discharge or vertigo.  NOSE: No loss of smell, no epistaxis or postnasal drip.  MOUTH & THROAT: No hoarseness or change in voice. No excessive gum bleeding.  NODES: Denies swollen glands.  CHEST: Denies TUBBS, cyanosis, wheezing, cough and sputum production.  CARDIOVASCULAR: Denies chest pain, PND, orthopnea or reduced exercise tolerance.  ABDOMEN: Appetite fine. No weight loss. Denies diarrhea, abdominal pain, hematemesis or blood in stool.  URINARY: No flank pain, dysuria or hematuria.  PERIPHERAL VASCULAR: No claudication or cyanosis.  MUSCULOSKELETAL: No joint stiffness or swelling. Denies back pain.  NEUROLOGIC: No history of seizures, paralysis, alteration of gait or coordination.    PE:   /80   Ht 5' 6.5" (1.689 m)   Wt 87.6 kg (193 lb 2 oz)   LMP 2013   BMI 30.70 kg/m²   APPEARANCE: Well nourished, well developed, in no acute distress.  NECK: Neck symmetric without masses or thyromegaly.  BREASTS: Symmetrical, no skin changes or visible lesions. No palpable masses, nipple discharge or adenopathy bilaterally.  ABDOMEN: Flat. Soft. No tenderness or masses. No hepatosplenomegaly. No hernias. No CVA tenderness.  VULVA: No lesions. Normal female genitalia.  URETHRAL MEATUS: Normal size and location, no lesions, no prolapse.  URETHRA: No masses, tenderness, prolapse or scarring.  VAGINA: Moist and well rugated, no discharge, no significant cystocele or rectocele.  CERVIX: No " lesions and discharge. PAP done.  UTERUS: Normal size, regular shape, mobile, non-tender, bladder base nontender.  ADNEXA: No masses, tenderness or CDS nodularity.  ANUS PERINEUM: Normal.    PROCEDURES:  Pap smear    Assessment:  Normal Gynecologic Exam    Plan:  Mammogram and Colonoscopy if indicated by current recommendations.  Return to clinic in one year or for any problems or complaints.  No gyn co,

## 2018-03-13 RX ORDER — PRAVASTATIN SODIUM 80 MG/1
TABLET ORAL
Qty: 30 TABLET | Refills: 0 | OUTPATIENT
Start: 2018-03-13

## 2018-05-29 ENCOUNTER — OFFICE VISIT (OUTPATIENT)
Dept: PODIATRY | Facility: CLINIC | Age: 53
End: 2018-05-29
Payer: COMMERCIAL

## 2018-05-29 VITALS
BODY MASS INDEX: 30.66 KG/M2 | HEIGHT: 67 IN | SYSTOLIC BLOOD PRESSURE: 121 MMHG | DIASTOLIC BLOOD PRESSURE: 71 MMHG | WEIGHT: 195.38 LBS | HEART RATE: 76 BPM

## 2018-05-29 DIAGNOSIS — E11.49 TYPE II DIABETES MELLITUS WITH NEUROLOGICAL MANIFESTATIONS: Primary | ICD-10-CM

## 2018-05-29 DIAGNOSIS — R23.4 FISSURE IN SKIN OF FOOT: ICD-10-CM

## 2018-05-29 PROCEDURE — 99213 OFFICE O/P EST LOW 20 MIN: CPT | Mod: S$GLB,,, | Performed by: PODIATRIST

## 2018-05-29 PROCEDURE — 99999 PR PBB SHADOW E&M-EST. PATIENT-LVL III: CPT | Mod: PBBFAC,,, | Performed by: PODIATRIST

## 2018-05-29 RX ORDER — METOPROLOL TARTRATE 25 MG/1
25 TABLET, FILM COATED ORAL 2 TIMES DAILY
COMMUNITY

## 2018-05-29 RX ORDER — GABAPENTIN 800 MG/1
800 TABLET ORAL 3 TIMES DAILY
COMMUNITY

## 2018-05-29 NOTE — PROGRESS NOTES
"Subjective:      Patient ID: Jamila Xiong is a 52 y.o. female.    Chief Complaint: Diabetes Mellitus (PCP Dr. Pino, Jan. 2018); Callouses; Diabetic Foot Exam; Routine Foot Care; and Foot Problem (bilateral heel)    Jamila is a 52 y.o. female who presents to the podiatry clinic  with complaint of  bilateral foot pain to b/l heels. Onset of the symptoms was several weeks ago. Precipitating event: none known. Current symptoms include: ability to bear weight, but with some pain. Aggravating factors: walking. Symptoms have gradually worsened. Patient has had prior foot problems.       Review of Systems   Constitution: Negative for chills, decreased appetite and fever.   Cardiovascular: Negative for leg swelling.   Skin: Positive for dry skin.   Musculoskeletal: Negative for arthritis, joint pain, joint swelling and myalgias.   Gastrointestinal: Negative for nausea and vomiting.   Neurological: Negative for loss of balance, numbness and paresthesias.           Objective:       Vitals:    05/29/18 0955   BP: 121/71   Pulse: 76   Weight: 88.6 kg (195 lb 6.4 oz)   Height: 5' 6.5" (1.689 m)   PainSc: 0-No pain        Physical Exam   Constitutional: She is oriented to person, place, and time. She appears well-developed and well-nourished.   Cardiovascular:   Pulses:       Dorsalis pedis pulses are 2+ on the right side, and 2+ on the left side.        Posterior tibial pulses are 2+ on the right side, and 2+ on the left side.   Musculoskeletal: She exhibits no edema or tenderness.        Right ankle: Normal.        Left ankle: Normal.        Right foot: There is no swelling, no crepitus and no deformity.        Left foot: There is no swelling, no crepitus and no deformity.   Adequate joint range of motion without pain, limitation, nor crepitation Bilateral feet and ankle joints. Muscle strength is 5/5 in all groups bilaterally.         Lymphadenopathy:   No palpable lymph nodes   Neurological: She is alert and oriented to person, " place, and time. She has normal strength.   Skin: Skin is warm, dry and intact. No rash noted. No erythema. Nails show no clubbing.   Hyperkeratotic tissue noted to posterior calcaneal rim b/l w/ splitting No surrounding erythema, edema, malodor, nor drainage noted        Psychiatric: She has a normal mood and affect. Her behavior is normal.             Assessment:       Encounter Diagnoses   Name Primary?    Type II diabetes mellitus with neurological manifestations Yes    Fissure in skin of foot          Plan:       Jamila was seen today for diabetes mellitus, callouses, diabetic foot exam, routine foot care and foot problem.    Diagnoses and all orders for this visit:    Type II diabetes mellitus with neurological manifestations    Fissure in skin of foot      I counseled the patient on her conditions, their implications and medical management.      With the patients permission I did  file the hyperkeratotic tissue w/ a mechanical  as a courtesy . Patient tolerated this well    Heel fissures w/o active skin break down     Will order Urea 40 from [employer]

## 2018-07-15 RX ORDER — PRAVASTATIN SODIUM 80 MG/1
TABLET ORAL
Qty: 30 TABLET | Refills: 0 | OUTPATIENT
Start: 2018-07-15

## 2018-11-07 ENCOUNTER — IMMUNIZATION (OUTPATIENT)
Dept: PHARMACY | Facility: CLINIC | Age: 53
End: 2018-11-07
Payer: COMMERCIAL

## 2018-11-07 DIAGNOSIS — Z12.39 SCREENING BREAST EXAMINATION: ICD-10-CM

## 2018-11-07 DIAGNOSIS — M81.0 OSTEOPOROSIS: Primary | ICD-10-CM

## 2018-12-13 ENCOUNTER — TELEPHONE (OUTPATIENT)
Dept: OBSTETRICS AND GYNECOLOGY | Facility: CLINIC | Age: 53
End: 2018-12-13

## 2018-12-13 NOTE — TELEPHONE ENCOUNTER
Pt state her daughter think she have a hemorrhoid internal and it was bleeding for a week but this week hasn't been bleeding. Pt not to sure what it is and is very concern. Pt mother state her daughter is a pt of yours. pt mother wants to could her daughter be seen with you sooner or could tell her what it is. Pt name is Mya Inga. : [birth year]

## 2018-12-13 NOTE — TELEPHONE ENCOUNTER
----- Message from Genesis Brennan sent at 12/13/2018  3:16 PM CST -----  Contact: [phone]  Patient called in requesting to speak with you. Patient prefers to speak with a nurse. Please call.

## 2018-12-14 ENCOUNTER — HOSPITAL ENCOUNTER (OUTPATIENT)
Dept: RADIOLOGY | Facility: HOSPITAL | Age: 53
Discharge: HOME | End: 2018-12-14
Attending: INTERNAL MEDICINE
Payer: COMMERCIAL

## 2018-12-14 DIAGNOSIS — Z12.39 SCREENING BREAST EXAMINATION: ICD-10-CM

## 2018-12-14 DIAGNOSIS — M81.0 OSTEOPOROSIS: ICD-10-CM

## 2018-12-14 PROCEDURE — 77080 DXA BONE DENSITY AXIAL: CPT | Mod: TC

## 2018-12-14 PROCEDURE — 77067 SCR MAMMO BI INCL CAD: CPT | Mod: TC

## 2018-12-14 PROCEDURE — 77067 SCR MAMMO BI INCL CAD: CPT | Mod: 26,,, | Performed by: RADIOLOGY

## 2018-12-14 PROCEDURE — 77063 BREAST TOMOSYNTHESIS BI: CPT | Mod: TC

## 2018-12-14 PROCEDURE — 77063 BREAST TOMOSYNTHESIS BI: CPT | Mod: 26,,, | Performed by: RADIOLOGY

## 2018-12-14 PROCEDURE — 77080 DXA BONE DENSITY AXIAL: CPT | Mod: 26,,, | Performed by: RADIOLOGY

## 2019-04-09 ENCOUNTER — OFFICE VISIT (OUTPATIENT)
Dept: OBSTETRICS AND GYNECOLOGY | Facility: CLINIC | Age: 54
End: 2019-04-09
Payer: COMMERCIAL

## 2019-04-09 VITALS
DIASTOLIC BLOOD PRESSURE: 70 MMHG | HEIGHT: 66 IN | BODY MASS INDEX: 31.46 KG/M2 | WEIGHT: 195.75 LBS | SYSTOLIC BLOOD PRESSURE: 108 MMHG

## 2019-04-09 DIAGNOSIS — Z12.4 ROUTINE PAPANICOLAOU SMEAR: Primary | ICD-10-CM

## 2019-04-09 DIAGNOSIS — Z01.419 WELL WOMAN EXAM WITH ROUTINE GYNECOLOGICAL EXAM: ICD-10-CM

## 2019-04-09 PROCEDURE — 99999 PR PBB SHADOW E&M-EST. PATIENT-LVL III: CPT | Mod: PBBFAC,,, | Performed by: OBSTETRICS & GYNECOLOGY

## 2019-04-09 PROCEDURE — 88175 CYTOPATH C/V AUTO FLUID REDO: CPT

## 2019-04-09 PROCEDURE — 99396 PREV VISIT EST AGE 40-64: CPT | Mod: S$GLB,,, | Performed by: OBSTETRICS & GYNECOLOGY

## 2019-04-09 PROCEDURE — 99396 PR PREVENTIVE VISIT,EST,40-64: ICD-10-PCS | Mod: S$GLB,,, | Performed by: OBSTETRICS & GYNECOLOGY

## 2019-04-09 PROCEDURE — 99999 PR PBB SHADOW E&M-EST. PATIENT-LVL III: ICD-10-PCS | Mod: PBBFAC,,, | Performed by: OBSTETRICS & GYNECOLOGY

## 2019-04-09 NOTE — PROGRESS NOTES
"HPI:   53 y.o.   OB History        2    Para   2    Term   2            AB        Living   2       SAB        TAB        Ectopic        Multiple        Live Births   2              Patient's last menstrual period was 2013.    Patient is  here for her annual gynecologic exam.  She has no complaints.     ROS:  GENERAL: No fever, chills, fatigability or weight loss.  SKIN: No rashes, itching or changes in color or texture of skin.  HEAD: No headaches or recent head trauma.  EYES: Visual acuity fine. No photophobia, ocular pain or diplopia.  EARS: Denies ear pain, discharge or vertigo.  NOSE: No loss of smell, no epistaxis or postnasal drip.  MOUTH & THROAT: No hoarseness or change in voice. No excessive gum bleeding.  NODES: Denies swollen glands.  CHEST: Denies TUBBS, cyanosis, wheezing, cough and sputum production.  CARDIOVASCULAR: Denies chest pain, PND, orthopnea or reduced exercise tolerance.  ABDOMEN: Appetite fine. No weight loss. Denies diarrhea, abdominal pain, hematemesis or blood in stool.  URINARY: No flank pain, dysuria or hematuria.  PERIPHERAL VASCULAR: No claudication or cyanosis.  MUSCULOSKELETAL: No joint stiffness or swelling. Denies back pain.  NEUROLOGIC: No history of seizures, paralysis, alteration of gait or coordination.    PE:   /70   Ht 5' 6" (1.676 m)   Wt 88.8 kg (195 lb 12.3 oz)   LMP 2013   BMI 31.60 kg/m²   APPEARANCE: Well nourished, well developed, in no acute distress.  NECK: Neck symmetric without masses or thyromegaly.  BREASTS: Symmetrical, no skin changes or visible lesions. No palpable masses, nipple discharge or adenopathy bilaterally.  ABDOMEN: Flat. Soft. No tenderness or masses. No hepatosplenomegaly. No hernias. No CVA tenderness.  VULVA: No lesions. Normal female genitalia.  URETHRAL MEATUS: Normal size and location, no lesions, no prolapse.  URETHRA: No masses, tenderness, prolapse or scarring.  VAGINA: Moist and well rugated, no " discharge, no significant cystocele or rectocele.  CERVIX: No lesions and discharge. PAP done.  UTERUS: Normal size, regular shape, mobile, non-tender, bladder base nontender.  ADNEXA: No masses, tenderness or CDS nodularity.  ANUS PERINEUM: Normal.    PROCEDURES:  Pap smear    Assessment:  Normal Gynecologic Exam    Plan:  Mammogram and Colonoscopy if indicated by current recommendations.  Return to clinic in one year or for any problems or complaints.  No gyn co  Had mammo  1/2cm cx polyp no concern

## 2019-06-07 ENCOUNTER — IMMUNIZATION (OUTPATIENT)
Dept: PHARMACY | Facility: CLINIC | Age: 54
End: 2019-06-07

## 2019-06-07 ENCOUNTER — IMMUNIZATION (OUTPATIENT)
Dept: PHARMACY | Facility: CLINIC | Age: 54
End: 2019-06-07
Payer: COMMERCIAL

## 2019-06-12 NOTE — ED NOTES
Health Maintenance Due   Topic Date Due   • Diabetes Eye Exam  [birth year]1971   • Diabetes Urine Microalbumin  [birth year]03/1971   • Hepatitis C Screening  03/03/2004   • Lung Cancer Screening  03/03/2008   • Pneumococcal Vaccine 65+ (2 of 2 - PPSV23) 05/01/2019       Patient is due for topics listed above, she wishes to proceed with Immunization(s) Pneumococcal and Diabetes Urine Microalbumin, but is not proceeding with Hepatitis C Screening at this time. The following has occured:              Radiology at bedside.

## 2019-10-07 ENCOUNTER — OFFICE VISIT (OUTPATIENT)
Dept: PODIATRY | Facility: CLINIC | Age: 54
End: 2019-10-07
Payer: COMMERCIAL

## 2019-10-07 VITALS
DIASTOLIC BLOOD PRESSURE: 79 MMHG | HEART RATE: 82 BPM | HEIGHT: 66 IN | BODY MASS INDEX: 31.34 KG/M2 | SYSTOLIC BLOOD PRESSURE: 138 MMHG | WEIGHT: 195 LBS

## 2019-10-07 DIAGNOSIS — M10.9 ACUTE GOUT INVOLVING TOE OF RIGHT FOOT, UNSPECIFIED CAUSE: ICD-10-CM

## 2019-10-07 DIAGNOSIS — E11.49 TYPE II DIABETES MELLITUS WITH NEUROLOGICAL MANIFESTATIONS: Primary | ICD-10-CM

## 2019-10-07 DIAGNOSIS — B35.3 TINEA PEDIS OF LEFT FOOT: ICD-10-CM

## 2019-10-07 PROCEDURE — 99999 PR PBB SHADOW E&M-EST. PATIENT-LVL III: ICD-10-PCS | Mod: PBBFAC,,, | Performed by: PODIATRIST

## 2019-10-07 PROCEDURE — 99214 OFFICE O/P EST MOD 30 MIN: CPT | Mod: S$GLB,,, | Performed by: PODIATRIST

## 2019-10-07 PROCEDURE — 99999 PR PBB SHADOW E&M-EST. PATIENT-LVL III: CPT | Mod: PBBFAC,,, | Performed by: PODIATRIST

## 2019-10-07 PROCEDURE — 99214 PR OFFICE/OUTPT VISIT, EST, LEVL IV, 30-39 MIN: ICD-10-PCS | Mod: S$GLB,,, | Performed by: PODIATRIST

## 2019-10-07 RX ORDER — INDOMETHACIN 50 MG/1
50 CAPSULE ORAL 2 TIMES DAILY WITH MEALS
Qty: 20 CAPSULE | Refills: 1 | Status: SHIPPED | OUTPATIENT
Start: 2019-10-07 | End: 2019-12-06 | Stop reason: SDUPTHER

## 2019-10-07 RX ORDER — ECONAZOLE NITRATE 10 MG/G
CREAM TOPICAL 2 TIMES DAILY
Qty: 85 G | Refills: 1 | Status: SHIPPED | OUTPATIENT
Start: 2019-10-07 | End: 2019-11-06

## 2019-10-07 NOTE — PROGRESS NOTES
Subjective:      Patient ID: Jamila Xiong is a 53 y.o. female.    Chief Complaint: Diabetic Foot Exam (dr tushar pino 19)    Jamila is a 53 y.o. female who presents to the clinic upon referral from Dr. Last ref. provider found  for evaluation and treatment of diabetic feet. Jamila has a past medical history of Abnormal Pap smear and Hypertension. Patient relates no major problem with feet. Only complaints today consist of yearly comprehensive diabetic  foot examination and r great toe redness .    PCP: Tushar Pino MD    Date Last Seen by PCP:   Chief Complaint   Patient presents with    Diabetic Foot Exam     dr tushar pino 19         Current shoe gear: Casual shoes    No results found for: HGBA1C          Review of Systems   Constitution: Negative for chills, decreased appetite and fever.   Cardiovascular: Negative for leg swelling.   Skin: Positive for dry skin.   Musculoskeletal: Negative for arthritis, joint pain, joint swelling and myalgias.   Gastrointestinal: Negative for nausea and vomiting.   Neurological: Negative for loss of balance, numbness and paresthesias.         There is no problem list on file for this patient.      Current Outpatient Medications on File Prior to Visit   Medication Sig Dispense Refill    gabapentin (NEURONTIN) 800 MG tablet Take 800 mg by mouth 3 (three) times daily.      glipiZIDE (GLUCOTROL) 5 MG TR24 TK 1 T PO  D  4    metoprolol tartrate (LOPRESSOR) 25 MG tablet Take 25 mg by mouth 2 (two) times daily.      pravastatin (PRAVACHOL) 80 MG tablet TK 1 T PO QD  4    triamterene-hydrochlorothiazide 75-50 mg (MAXZIDE) 75-50 mg per tablet       [DISCONTINUED] econazole nitrate 1 % cream Apply topically 2 (two) times daily. 85 g 1     No current facility-administered medications on file prior to visit.        Review of patient's allergies indicates:  No Known Allergies    Past Surgical History:   Procedure Laterality Date     SECTION      DILATION AND CURETTAGE  "OF UTERUS      TUBAL LIGATION         Family History   Problem Relation Age of Onset    Diabetes Father     Hypertension Father     Diabetes Mother     Hypertension Mother     Ovarian cancer Mother     Cancer Mother     Hypertension Brother     Cancer Sister         skin       Social History     Socioeconomic History    Marital status:      Spouse name: Not on file    Number of children: Not on file    Years of education: Not on file    Highest education level: Not on file   Occupational History    Not on file   Social Needs    Financial resource strain: Not on file    Food insecurity:     Worry: Not on file     Inability: Not on file    Transportation needs:     Medical: Not on file     Non-medical: Not on file   Tobacco Use    Smoking status: Never Smoker   Substance and Sexual Activity    Alcohol use: Yes     Comment: socially    Drug use: Not on file    Sexual activity: Yes     Partners: Male   Lifestyle    Physical activity:     Days per week: Not on file     Minutes per session: Not on file    Stress: Not on file   Relationships    Social connections:     Talks on phone: Not on file     Gets together: Not on file     Attends [employer] service: Not on file     Active member of club or organization: Not on file     Attends meetings of clubs or organizations: Not on file     Relationship status: Not on file   Other Topics Concern    Not on file   Social History Narrative    Not on file               Objective:       Vitals:    10/07/19 0827   BP: 138/79   Pulse: 82   Weight: 88.5 kg (195 lb)   Height: 5' 6" (1.676 m)   PainSc:   2        Physical Exam   Constitutional: She appears well-developed and well-nourished.  Non-toxic appearance. She does not have a sickly appearance. No distress.   alert and oriented x 3.    Cardiovascular:   Pulses:       Dorsalis pedis pulses are 2+ on the right side, and 2+ on the left side.        Posterior tibial pulses are 2+ on the right side, and " 2+ on the left side.    Capillary refill time is within normal limits. Digital hair present.    Pulmonary/Chest: No respiratory distress.   Musculoskeletal: She exhibits no deformity.        Right ankle: No tenderness. No lateral malleolus, no medial malleolus, no AITFL, no CF ligament and no posterior TFL tenderness found. Achilles tendon exhibits no pain, no defect and normal Reece's test results.        Left ankle: No tenderness. No lateral malleolus, no medial malleolus, no AITFL, no CF ligament and no posterior TFL tenderness found. Achilles tendon exhibits no pain, no defect and normal Reece's test results.        Right foot: There is no tenderness and no bony tenderness.        Left foot: There is no tenderness and no bony tenderness.   Adequate joint range of motion without pain, limitation, nor crepitation Bilateral feet and ankle joints. Muscle strength is 5/5 in all groups bilaterally.        Tenderness r 1st MPJ w/ erythema and slight increase in warmth    Feet:   Right Foot:   Protective Sensation: 5 sites tested. 5 sites sensed.   Left Foot:   Protective Sensation: 5 sites tested. 5 sites sensed.   Lymphadenopathy:   No lymphatic streaking     Neurological: She displays no atrophy. No sensory deficit.   Light touch present     Skin: Skin is warm, dry and intact. No rash noted. She is not diaphoretic. No cyanosis. No pallor. Nails show no clubbing.   Skin is of normal turgor.   Normal temperature gradient.  Examination of the skin reveals no evidence of significant rashes, open lesions, suspicious appearing nevi or other concerning lesions.      Toenails 1-5 bilaterally are neatly trimmed; of normal color and thickness       Psychiatric: Her mood appears not anxious. Her affect is not inappropriate. Her speech is not slurred. She is not combative. She is communicative. She is attentive.   Nursing note and vitals reviewed.            Assessment:       Encounter Diagnoses   Name Primary?    Type II  diabetes mellitus with neurological manifestations Yes    Acute gout involving toe of right foot, unspecified cause          Plan:       Jamila was seen today for diabetic foot exam.    Diagnoses and all orders for this visit:    Type II diabetes mellitus with neurological manifestations    Acute gout involving toe of right foot, unspecified cause    Other orders  -     econazole nitrate 1 % cream; Apply topically 2 (two) times daily.  -     indomethacin (INDOCIN) 50 MG capsule; Take 1 capsule (50 mg total) by mouth 2 (two) times daily with meals. for 10 days      I counseled the patient on her conditions, their implications and medical management.      - Shoe inspection. Diabetic Foot Education. Patient reminded of the importance of good nutrition and blood sugar control to help prevent podiatric complications of diabetes. Patient instructed on proper foot hygeine. We discussed wearing proper shoe gear, daily foot inspections, never walking without protective shoe gear, caution putting sharp instruments to feet     - Discussed DM foot care:  Wear comfortable, proper fitting shoes. Wash feet daily. Dry well. After drying, apply moisturizer to feet (no lotion to webspaces). Inspect feet daily for skin breaks, blisters, swelling, or redness. Wear cotton socks (preferably white)  Change socks every day. Do NOT walk barefoot. Do NOT use heating pads or warm/hot water soaks     - Discussed importance of daily moisturizer to the feet such as Gold bonds diabetic foot cream    - Patient is low risk for developing lower extremity issues secondary to diabetes. I recommend continued yearly diabetic foot examinations.     - Patients PCP can perform yearly foot checks . Currently  patient has no pedal manifestations of DM    - The nature of gout is fully explained, including dietary relationship, acute and interval phase and treatment of both. Long term complications such as kidney stones, tophi and arthritis are discussed.  Avoidance of alcohol recommended, and written literature is given along with a low purine diet. Indications for the use of allopurinol for prophylaxis and the use of colchicine to prevent or treat flare-ups is also discussed. Proper use of indomethacin for acute attacks discussed, and its side effects. Call if further attacks occur, or this one does not resolve promptly.    - Patients with out pedal manifestations of DM, do not qualify for nail/callus trimming     - RTC in  PRN

## 2019-10-08 ENCOUNTER — TELEPHONE (OUTPATIENT)
Dept: PODIATRY | Facility: CLINIC | Age: 54
End: 2019-10-08

## 2019-10-08 RX ORDER — KETOCONAZOLE 20 MG/G
CREAM TOPICAL DAILY
Qty: 60 G | Refills: 3 | Status: SHIPPED | OUTPATIENT
Start: 2019-10-08 | End: 2021-04-01

## 2019-10-08 NOTE — TELEPHONE ENCOUNTER
----- Message from Romero Lopez sent at 10/8/2019  1:05 PM CDT -----  Contact: [facility]  econazole nitrate 1 % cream is rejecting need to try another medication prior to prescribing the econazole nitrate      Contact info  [phone]

## 2019-10-27 RX ORDER — GLIPIZIDE 5 MG/1
TABLET, FILM COATED, EXTENDED RELEASE ORAL
Qty: 90 TABLET | Refills: 0 | OUTPATIENT
Start: 2019-10-27

## 2019-11-01 RX ORDER — METOPROLOL SUCCINATE 50 MG/1
TABLET, EXTENDED RELEASE ORAL
Qty: 30 TABLET | Refills: 0 | OUTPATIENT
Start: 2019-11-01

## 2019-11-05 RX ORDER — LISINOPRIL AND HYDROCHLOROTHIAZIDE 20; 25 MG/1; MG/1
TABLET ORAL
Qty: 90 TABLET | Refills: 0 | OUTPATIENT
Start: 2019-11-05

## 2019-11-20 ENCOUNTER — IMMUNIZATION (OUTPATIENT)
Dept: PHARMACY | Facility: CLINIC | Age: 54
End: 2019-11-20
Payer: COMMERCIAL

## 2019-12-06 RX ORDER — INDOMETHACIN 50 MG/1
CAPSULE ORAL
Qty: 20 CAPSULE | Refills: 0 | Status: SHIPPED | OUTPATIENT
Start: 2019-12-06 | End: 2021-04-01

## 2020-06-22 ENCOUNTER — HOSPITAL ENCOUNTER (OUTPATIENT)
Dept: RADIOLOGY | Facility: HOSPITAL | Age: 55
Discharge: HOME | End: 2020-06-22
Attending: INTERNAL MEDICINE
Payer: COMMERCIAL

## 2020-06-22 DIAGNOSIS — Z12.31 SCREENING MAMMOGRAM, ENCOUNTER FOR: ICD-10-CM

## 2020-06-22 PROCEDURE — 77063 BREAST TOMOSYNTHESIS BI: CPT | Mod: 26,,, | Performed by: RADIOLOGY

## 2020-06-22 PROCEDURE — 77063 MAMMO DIGITAL SCREENING BILAT WITH TOMOSYNTHESIS_CAD: ICD-10-PCS | Mod: 26,,, | Performed by: RADIOLOGY

## 2020-06-22 PROCEDURE — 77067 SCR MAMMO BI INCL CAD: CPT | Mod: TC

## 2020-06-22 PROCEDURE — 77067 MAMMO DIGITAL SCREENING BILAT WITH TOMOSYNTHESIS_CAD: ICD-10-PCS | Mod: 26,,, | Performed by: RADIOLOGY

## 2020-06-22 PROCEDURE — 77067 SCR MAMMO BI INCL CAD: CPT | Mod: 26,,, | Performed by: RADIOLOGY

## 2020-09-25 ENCOUNTER — OFFICE VISIT (OUTPATIENT)
Dept: OBSTETRICS AND GYNECOLOGY | Facility: CLINIC | Age: 55
End: 2020-09-25
Payer: COMMERCIAL

## 2020-09-25 VITALS
WEIGHT: 197 LBS | BODY MASS INDEX: 31.66 KG/M2 | SYSTOLIC BLOOD PRESSURE: 102 MMHG | DIASTOLIC BLOOD PRESSURE: 70 MMHG | HEIGHT: 66 IN

## 2020-09-25 DIAGNOSIS — Z12.4 SCREENING FOR CERVICAL CANCER: ICD-10-CM

## 2020-09-25 DIAGNOSIS — Z01.419 WELL WOMAN EXAM WITH ROUTINE GYNECOLOGICAL EXAM: Primary | ICD-10-CM

## 2020-09-25 PROCEDURE — 99999 PR PBB SHADOW E&M-EST. PATIENT-LVL III: ICD-10-PCS | Mod: PBBFAC,,, | Performed by: OBSTETRICS & GYNECOLOGY

## 2020-09-25 PROCEDURE — 99396 PREV VISIT EST AGE 40-64: CPT | Mod: S$GLB,,, | Performed by: OBSTETRICS & GYNECOLOGY

## 2020-09-25 PROCEDURE — 88175 CYTOPATH C/V AUTO FLUID REDO: CPT

## 2020-09-25 PROCEDURE — 99396 PR PREVENTIVE VISIT,EST,40-64: ICD-10-PCS | Mod: S$GLB,,, | Performed by: OBSTETRICS & GYNECOLOGY

## 2020-09-25 PROCEDURE — 99999 PR PBB SHADOW E&M-EST. PATIENT-LVL III: CPT | Mod: PBBFAC,,, | Performed by: OBSTETRICS & GYNECOLOGY

## 2020-09-25 RX ORDER — AMITRIPTYLINE HYDROCHLORIDE 25 MG/1
TABLET, FILM COATED ORAL
COMMUNITY
Start: 2020-09-22

## 2020-09-25 RX ORDER — ALLOPURINOL 300 MG/1
300 TABLET ORAL
COMMUNITY
Start: 2020-02-24

## 2020-09-25 RX ORDER — BLOOD-GLUCOSE METER
KIT MISCELLANEOUS
COMMUNITY
Start: 2020-06-30

## 2020-09-25 RX ORDER — MELOXICAM 15 MG/1
15 TABLET ORAL
COMMUNITY
Start: 2020-02-24 | End: 2021-04-01

## 2020-09-25 RX ORDER — LISINOPRIL 40 MG/1
TABLET ORAL
COMMUNITY
Start: 2020-08-18

## 2020-09-25 RX ORDER — EZETIMIBE 10 MG/1
10 TABLET ORAL
COMMUNITY
Start: 2020-02-24

## 2020-09-25 NOTE — PROGRESS NOTES
"HPI:   54 y.o.   OB History        2    Para   2    Term   2            AB        Living   2       SAB        TAB        Ectopic        Multiple        Live Births   2              Patient's last menstrual period was 2013.    Patient is  here for her annual gynecologic exam.  She has no complaints.     ROS:  GENERAL: No fever, chills, fatigability or weight loss.  SKIN: No rashes, itching or changes in color or texture of skin.  HEAD: No headaches or recent head trauma.  EYES: Visual acuity fine. No photophobia, ocular pain or diplopia.  EARS: Denies ear pain, discharge or vertigo.  NOSE: No loss of smell, no epistaxis or postnasal drip.  MOUTH & THROAT: No hoarseness or change in voice. No excessive gum bleeding.  NODES: Denies swollen glands.  CHEST: Denies TUBBS, cyanosis, wheezing, cough and sputum production.  CARDIOVASCULAR: Denies chest pain, PND, orthopnea or reduced exercise tolerance.  ABDOMEN: Appetite fine. No weight loss. Denies diarrhea, abdominal pain, hematemesis or blood in stool.  URINARY: No flank pain, dysuria or hematuria.  PERIPHERAL VASCULAR: No claudication or cyanosis.  MUSCULOSKELETAL: No joint stiffness or swelling. Denies back pain.  NEUROLOGIC: No history of seizures, paralysis, alteration of gait or coordination.    PE:   /70   Ht 5' 6" (1.676 m)   Wt 89.4 kg (197 lb)   LMP 2013   BMI 31.80 kg/m²   APPEARANCE: Well nourished, well developed, in no acute distress.  NECK: Neck symmetric without masses or thyromegaly.  BREASTS: Symmetrical, no skin changes or visible lesions. No palpable masses, nipple discharge or adenopathy bilaterally.  ABDOMEN: Flat. Soft. No tenderness or masses. No hepatosplenomegaly. No hernias. No CVA tenderness.  VULVA: No lesions. Normal female genitalia.  URETHRAL MEATUS: Normal size and location, no lesions, no prolapse.  URETHRA: No masses, tenderness, prolapse or scarring.  VAGINA: Moist and well rugated, no discharge, no " significant cystocele or rectocele.  CERVIX: No lesions and discharge. PAP done.  UTERUS: Normal size, regular shape, mobile, non-tender, bladder base nontender.  ADNEXA: No masses, tenderness or CDS nodularity.  ANUS PERINEUM: Normal.    PROCEDURES:  Pap smear    Assessment:  Normal Gynecologic Exam    Plan:  Mammogram and Colonoscopy if indicated by current recommendations.  Return to clinic in one year or for any problems or complaints.  No bleeding  Small cx polyp

## 2020-10-16 LAB
FINAL PATHOLOGIC DIAGNOSIS: NORMAL
Lab: NORMAL

## 2021-02-26 ENCOUNTER — IMMUNIZATION (OUTPATIENT)
Dept: PRIMARY CARE CLINIC | Facility: CLINIC | Age: 56
End: 2021-02-26
Payer: COMMERCIAL

## 2021-02-26 DIAGNOSIS — Z23 NEED FOR VACCINATION: Primary | ICD-10-CM

## 2021-02-26 PROCEDURE — 0011A COVID-19, MRNA, LNP-S, PF, 100 MCG/0.5 ML DOSE VACCINE: CPT | Mod: PBBFAC | Performed by: EMERGENCY MEDICINE

## 2021-03-17 ENCOUNTER — TELEPHONE (OUTPATIENT)
Dept: INTERVENTIONAL RADIOLOGY/VASCULAR | Facility: HOSPITAL | Age: 56
End: 2021-03-17

## 2021-03-26 ENCOUNTER — IMMUNIZATION (OUTPATIENT)
Dept: PRIMARY CARE CLINIC | Facility: CLINIC | Age: 56
End: 2021-03-26
Payer: COMMERCIAL

## 2021-03-26 DIAGNOSIS — Z23 NEED FOR VACCINATION: Primary | ICD-10-CM

## 2021-03-26 PROCEDURE — 0012A COVID-19, MRNA, LNP-S, PF, 100 MCG/0.5 ML DOSE VACCINE: CPT | Mod: PBBFAC | Performed by: FAMILY MEDICINE

## 2021-04-01 ENCOUNTER — HOSPITAL ENCOUNTER (OUTPATIENT)
Dept: INTERVENTIONAL RADIOLOGY/VASCULAR | Facility: HOSPITAL | Age: 56
Discharge: HOME | End: 2021-04-01
Attending: INTERNAL MEDICINE
Payer: COMMERCIAL

## 2021-04-01 VITALS
SYSTOLIC BLOOD PRESSURE: 167 MMHG | RESPIRATION RATE: 12 BRPM | OXYGEN SATURATION: 96 % | DIASTOLIC BLOOD PRESSURE: 75 MMHG | TEMPERATURE: 97 F | HEART RATE: 77 BPM

## 2021-04-01 DIAGNOSIS — E04.1 NONTOXIC UNINODULAR GOITER: ICD-10-CM

## 2021-04-01 PROCEDURE — 88172 CYTP DX EVAL FNA 1ST EA SITE: CPT | Mod: 26,,, | Performed by: PATHOLOGY

## 2021-04-01 PROCEDURE — 10005 FNA BX W/US GDN 1ST LES: CPT

## 2021-04-01 PROCEDURE — 88173 PR  INTERPRETATION OF FNA SMEAR: ICD-10-PCS | Mod: 26,,, | Performed by: PATHOLOGY

## 2021-04-01 PROCEDURE — 88177 CYTP FNA EVAL EA ADDL: CPT | Performed by: PATHOLOGY

## 2021-04-01 PROCEDURE — 88173 CYTOPATH EVAL FNA REPORT: CPT | Mod: 26,,, | Performed by: PATHOLOGY

## 2021-04-01 PROCEDURE — 25000003 PHARM REV CODE 250: Performed by: RADIOLOGY

## 2021-04-01 PROCEDURE — 88172 CYTP DX EVAL FNA 1ST EA SITE: CPT | Performed by: PATHOLOGY

## 2021-04-01 PROCEDURE — 10005 IR US FINE NEEDLE ASPIRATION BIOPSY, FIRST LESION: ICD-10-PCS | Mod: ,,, | Performed by: RADIOLOGY

## 2021-04-01 PROCEDURE — 10005 FNA BX W/US GDN 1ST LES: CPT | Mod: ,,, | Performed by: RADIOLOGY

## 2021-04-01 PROCEDURE — 88173 CYTOPATH EVAL FNA REPORT: CPT | Performed by: PATHOLOGY

## 2021-04-01 PROCEDURE — 88172 PR  EVALUATION OF FNA SMEAR TO DETERMINE ADEQUACY, FIRST EVAL: ICD-10-PCS | Mod: 26,,, | Performed by: PATHOLOGY

## 2021-04-01 RX ORDER — LIDOCAINE HYDROCHLORIDE 10 MG/ML
INJECTION INFILTRATION; PERINEURAL CODE/TRAUMA/SEDATION MEDICATION
Status: COMPLETED | OUTPATIENT
Start: 2021-04-01 | End: 2021-04-01

## 2021-04-01 RX ORDER — INSULIN GLARGINE 100 [IU]/ML
15 INJECTION, SOLUTION SUBCUTANEOUS DAILY
COMMUNITY

## 2021-04-01 RX ADMIN — LIDOCAINE HYDROCHLORIDE 5 ML: 10 INJECTION, SOLUTION INFILTRATION; PERINEURAL at 10:04

## 2021-04-07 LAB
ADEQUACY: ABNORMAL
FINAL PATHOLOGIC DIAGNOSIS: ABNORMAL
Lab: ABNORMAL

## 2021-06-23 DIAGNOSIS — Z12.31 SCREENING MAMMOGRAM, ENCOUNTER FOR: Primary | ICD-10-CM

## 2021-08-05 ENCOUNTER — HOSPITAL ENCOUNTER (OUTPATIENT)
Dept: RADIOLOGY | Facility: HOSPITAL | Age: 56
Discharge: HOME | End: 2021-08-05
Attending: INTERNAL MEDICINE
Payer: COMMERCIAL

## 2021-08-05 DIAGNOSIS — Z12.31 SCREENING MAMMOGRAM, ENCOUNTER FOR: ICD-10-CM

## 2021-08-05 PROCEDURE — 77067 SCR MAMMO BI INCL CAD: CPT | Mod: 26,,, | Performed by: RADIOLOGY

## 2021-08-05 PROCEDURE — 77067 MAMMO DIGITAL SCREENING BILAT WITH TOMO: ICD-10-PCS | Mod: 26,,, | Performed by: RADIOLOGY

## 2021-08-05 PROCEDURE — 77063 BREAST TOMOSYNTHESIS BI: CPT | Mod: 26,,, | Performed by: RADIOLOGY

## 2021-08-05 PROCEDURE — 77063 MAMMO DIGITAL SCREENING BILAT WITH TOMO: ICD-10-PCS | Mod: 26,,, | Performed by: RADIOLOGY

## 2021-08-05 PROCEDURE — 77067 SCR MAMMO BI INCL CAD: CPT | Mod: TC

## 2021-11-17 ENCOUNTER — IMMUNIZATION (OUTPATIENT)
Dept: PRIMARY CARE CLINIC | Facility: CLINIC | Age: 56
End: 2021-11-17
Payer: COMMERCIAL

## 2021-11-17 DIAGNOSIS — Z23 NEED FOR VACCINATION: Primary | ICD-10-CM

## 2021-11-17 PROCEDURE — 0064A COVID-19, MRNA, LNP-S, PF, 100 MCG/0.25 ML DOSE VACCINE (MODERNA BOOSTER): CPT | Mod: CV19,PBBFAC | Performed by: INTERNAL MEDICINE

## 2021-11-30 ENCOUNTER — OFFICE VISIT (OUTPATIENT)
Dept: OBSTETRICS AND GYNECOLOGY | Facility: CLINIC | Age: 56
End: 2021-11-30
Payer: COMMERCIAL

## 2021-11-30 ENCOUNTER — LAB VISIT (OUTPATIENT)
Dept: LAB | Facility: HOSPITAL | Age: 56
End: 2021-11-30
Attending: OBSTETRICS & GYNECOLOGY
Payer: COMMERCIAL

## 2021-11-30 VITALS — WEIGHT: 198.5 LBS | SYSTOLIC BLOOD PRESSURE: 140 MMHG | DIASTOLIC BLOOD PRESSURE: 80 MMHG | BODY MASS INDEX: 32.04 KG/M2

## 2021-11-30 DIAGNOSIS — N83.209 CYST OF OVARY, UNSPECIFIED LATERALITY: ICD-10-CM

## 2021-11-30 DIAGNOSIS — Z01.419 WELL WOMAN EXAM WITH ROUTINE GYNECOLOGICAL EXAM: Primary | ICD-10-CM

## 2021-11-30 DIAGNOSIS — Z01.419 WELL WOMAN EXAM WITH ROUTINE GYNECOLOGICAL EXAM: ICD-10-CM

## 2021-11-30 LAB — CANCER AG125 SERPL-ACNC: 8 U/ML (ref 0–30)

## 2021-11-30 PROCEDURE — 88175 CYTOPATH C/V AUTO FLUID REDO: CPT | Performed by: OBSTETRICS & GYNECOLOGY

## 2021-11-30 PROCEDURE — 99396 PREV VISIT EST AGE 40-64: CPT | Mod: S$GLB,,, | Performed by: OBSTETRICS & GYNECOLOGY

## 2021-11-30 PROCEDURE — 36415 COLL VENOUS BLD VENIPUNCTURE: CPT | Performed by: OBSTETRICS & GYNECOLOGY

## 2021-11-30 PROCEDURE — 99999 PR PBB SHADOW E&M-EST. PATIENT-LVL III: ICD-10-PCS | Mod: PBBFAC,,, | Performed by: OBSTETRICS & GYNECOLOGY

## 2021-11-30 PROCEDURE — 99396 PR PREVENTIVE VISIT,EST,40-64: ICD-10-PCS | Mod: S$GLB,,, | Performed by: OBSTETRICS & GYNECOLOGY

## 2021-11-30 PROCEDURE — 99999 PR PBB SHADOW E&M-EST. PATIENT-LVL III: CPT | Mod: PBBFAC,,, | Performed by: OBSTETRICS & GYNECOLOGY

## 2021-11-30 PROCEDURE — 86304 IMMUNOASSAY TUMOR CA 125: CPT | Performed by: OBSTETRICS & GYNECOLOGY

## 2021-12-07 LAB
FINAL PATHOLOGIC DIAGNOSIS: NORMAL
Lab: NORMAL

## 2022-07-07 DIAGNOSIS — Z12.31 ENCOUNTER FOR SCREENING MAMMOGRAM FOR MALIGNANT NEOPLASM OF BREAST: Primary | ICD-10-CM

## 2022-09-22 ENCOUNTER — HOSPITAL ENCOUNTER (OUTPATIENT)
Dept: RADIOLOGY | Facility: HOSPITAL | Age: 57
Discharge: HOME | End: 2022-09-22
Attending: INTERNAL MEDICINE
Payer: COMMERCIAL

## 2022-09-22 DIAGNOSIS — Z12.31 ENCOUNTER FOR SCREENING MAMMOGRAM FOR MALIGNANT NEOPLASM OF BREAST: ICD-10-CM

## 2022-09-22 PROCEDURE — 77063 MAMMO DIGITAL SCREENING BILAT WITH TOMO: ICD-10-PCS | Mod: 26,,, | Performed by: RADIOLOGY

## 2022-09-22 PROCEDURE — 77067 MAMMO DIGITAL SCREENING BILAT WITH TOMO: ICD-10-PCS | Mod: 26,,, | Performed by: RADIOLOGY

## 2022-09-22 PROCEDURE — 77067 SCR MAMMO BI INCL CAD: CPT | Mod: TC

## 2022-09-22 PROCEDURE — 77063 BREAST TOMOSYNTHESIS BI: CPT | Mod: 26,,, | Performed by: RADIOLOGY

## 2022-09-22 PROCEDURE — 77067 SCR MAMMO BI INCL CAD: CPT | Mod: 26,,, | Performed by: RADIOLOGY

## 2022-12-09 ENCOUNTER — OFFICE VISIT (OUTPATIENT)
Dept: URGENT CARE | Facility: CLINIC | Age: 57
End: 2022-12-09
Payer: COMMERCIAL

## 2022-12-09 VITALS
RESPIRATION RATE: 20 BRPM | DIASTOLIC BLOOD PRESSURE: 84 MMHG | BODY MASS INDEX: 30.53 KG/M2 | SYSTOLIC BLOOD PRESSURE: 144 MMHG | WEIGHT: 190 LBS | OXYGEN SATURATION: 95 % | HEART RATE: 75 BPM | HEIGHT: 66 IN | TEMPERATURE: 99 F

## 2022-12-09 DIAGNOSIS — B08.4 HAND, FOOT AND MOUTH DISEASE: Primary | ICD-10-CM

## 2022-12-09 PROCEDURE — 1159F MED LIST DOCD IN RCRD: CPT | Mod: CPTII,S$GLB,, | Performed by: FAMILY MEDICINE

## 2022-12-09 PROCEDURE — 3079F DIAST BP 80-89 MM HG: CPT | Mod: CPTII,S$GLB,, | Performed by: FAMILY MEDICINE

## 2022-12-09 PROCEDURE — 3079F PR MOST RECENT DIASTOLIC BLOOD PRESSURE 80-89 MM HG: ICD-10-PCS | Mod: CPTII,S$GLB,, | Performed by: FAMILY MEDICINE

## 2022-12-09 PROCEDURE — 1159F PR MEDICATION LIST DOCUMENTED IN MEDICAL RECORD: ICD-10-PCS | Mod: CPTII,S$GLB,, | Performed by: FAMILY MEDICINE

## 2022-12-09 PROCEDURE — 4010F PR ACE/ARB THEARPY RXD/TAKEN: ICD-10-PCS | Mod: CPTII,S$GLB,, | Performed by: FAMILY MEDICINE

## 2022-12-09 PROCEDURE — 99214 PR OFFICE/OUTPT VISIT, EST, LEVL IV, 30-39 MIN: ICD-10-PCS | Mod: S$GLB,,, | Performed by: FAMILY MEDICINE

## 2022-12-09 PROCEDURE — 3077F SYST BP >= 140 MM HG: CPT | Mod: CPTII,S$GLB,, | Performed by: FAMILY MEDICINE

## 2022-12-09 PROCEDURE — 3008F BODY MASS INDEX DOCD: CPT | Mod: CPTII,S$GLB,, | Performed by: FAMILY MEDICINE

## 2022-12-09 PROCEDURE — 4010F ACE/ARB THERAPY RXD/TAKEN: CPT | Mod: CPTII,S$GLB,, | Performed by: FAMILY MEDICINE

## 2022-12-09 PROCEDURE — 99214 OFFICE O/P EST MOD 30 MIN: CPT | Mod: S$GLB,,, | Performed by: FAMILY MEDICINE

## 2022-12-09 PROCEDURE — 3008F PR BODY MASS INDEX (BMI) DOCUMENTED: ICD-10-PCS | Mod: CPTII,S$GLB,, | Performed by: FAMILY MEDICINE

## 2022-12-09 PROCEDURE — 3077F PR MOST RECENT SYSTOLIC BLOOD PRESSURE >= 140 MM HG: ICD-10-PCS | Mod: CPTII,S$GLB,, | Performed by: FAMILY MEDICINE

## 2022-12-09 NOTE — PROGRESS NOTES
"Subjective:       Patient ID: Jamila Xiong is a 57 y.o. female.    Vitals:  height is 5' 6" (1.676 m) and weight is 86.2 kg (190 lb). Her oral temperature is 98.7 °F (37.1 °C). Her blood pressure is 144/84 (abnormal) and her pulse is 75. Her respiration is 20 and oxygen saturation is 95%.     Chief Complaint: Sore Throat and Hand Pain (BOTH THUMBS HAVE BLISTERS)      This is a 57 y.o. female who presents today with a chief complaint of sore throat, congestion, and both of her thumbs have blisters.Pt explain that last week her grandchild was diaginosis with Hand , Feet and Mouth and since the Pt stated is when her symptoms began and she received blisters on both of her thumbs.      Sore Throat   This is a new problem. The current episode started in the past 7 days. The problem has been unchanged. The pain is worse on the left side. There has been no fever. The fever has been present for Less than 1 day. The pain is at a severity of 2/10. The pain is mild. Associated symptoms include congestion, a hoarse voice and trouble swallowing. Pertinent negatives include no coughing, ear pain, headaches, neck pain or shortness of breath. She has tried acetaminophen for the symptoms. The treatment provided no relief.   Hand Pain   The incident occurred 2 days ago. The incident occurred at home. There was no injury mechanism. The pain is present in the right hand and left hand. The quality of the pain is described as burning. The pain radiates to the right hand and left hand. The pain is at a severity of 2/10. The pain is mild. The pain has been Constant since the incident. Nothing aggravates the symptoms. She has tried acetaminophen for the symptoms. The treatment provided no relief.     HENT:  Positive for congestion, sore throat and trouble swallowing. Negative for ear pain.    Neck: Negative for neck pain.   Respiratory:  Negative for cough and shortness of breath.    Skin:  Positive for erythema.   Neurological:  Negative " for headaches.     Objective:      Physical Exam   Constitutional: She does not appear ill. No distress. normal  HENT:   Head: Normocephalic and atraumatic.   Mouth/Throat: Posterior oropharyngeal erythema present. No oropharyngeal exudate (vesicular lesions noted on upper palate and buccal mucosa).   Cardiovascular: Normal rate, regular rhythm, normal heart sounds and normal pulses.   Pulmonary/Chest: Effort normal and breath sounds normal.   Abdominal: Normal appearance.   Neurological: She is alert.   Skin: Skin is rash (blister like lesions of palms of hands). erythema   Nursing note and vitals reviewed.      Results for orders placed or performed in visit on 11/30/21   CANCER ANTIGEN 125   Result Value Ref Range     8 0 - 30 U/mL      Assessment:       1. Hand, foot and mouth disease          Plan:         Hand, foot and mouth disease    Discussed symptomatic care and increased hydration. RTC prn worsening symptoms

## 2022-12-14 ENCOUNTER — TELEPHONE (OUTPATIENT)
Dept: PODIATRY | Facility: CLINIC | Age: 57
End: 2022-12-14
Payer: COMMERCIAL

## 2022-12-14 NOTE — TELEPHONE ENCOUNTER
----- Message from Sera Marquis sent at 12/14/2022  8:56 AM CST -----  Contact: pt  Pt states she was diagnosed with hand, foot, mouth virus and her foot is starting to look better, but would like Dr. Estes to look at it. No appts were available in system. Pt would like to be seen this week, if possible.     Confirmed contact below:  Contact Name:Jamila Pearll  Phone Number: [phone]

## 2022-12-14 NOTE — TELEPHONE ENCOUNTER
Spoke to pt and referred her to her PCP to manage Hand foot and mouth disease. Offered to help pt get scheduled for an appointment however, pt declined.

## 2023-02-14 ENCOUNTER — OFFICE VISIT (OUTPATIENT)
Dept: OBSTETRICS AND GYNECOLOGY | Facility: CLINIC | Age: 58
End: 2023-02-14
Payer: COMMERCIAL

## 2023-02-14 VITALS — SYSTOLIC BLOOD PRESSURE: 126 MMHG | DIASTOLIC BLOOD PRESSURE: 88 MMHG | BODY MASS INDEX: 31.1 KG/M2 | WEIGHT: 192.69 LBS

## 2023-02-14 DIAGNOSIS — Z01.419 WELL WOMAN EXAM WITH ROUTINE GYNECOLOGICAL EXAM: Primary | ICD-10-CM

## 2023-02-14 PROCEDURE — 3074F PR MOST RECENT SYSTOLIC BLOOD PRESSURE < 130 MM HG: ICD-10-PCS | Mod: CPTII,S$GLB,, | Performed by: OBSTETRICS & GYNECOLOGY

## 2023-02-14 PROCEDURE — 87624 HPV HI-RISK TYP POOLED RSLT: CPT | Performed by: OBSTETRICS & GYNECOLOGY

## 2023-02-14 PROCEDURE — 1159F MED LIST DOCD IN RCRD: CPT | Mod: CPTII,S$GLB,, | Performed by: OBSTETRICS & GYNECOLOGY

## 2023-02-14 PROCEDURE — 99396 PR PREVENTIVE VISIT,EST,40-64: ICD-10-PCS | Mod: S$GLB,,, | Performed by: OBSTETRICS & GYNECOLOGY

## 2023-02-14 PROCEDURE — 88175 CYTOPATH C/V AUTO FLUID REDO: CPT | Performed by: OBSTETRICS & GYNECOLOGY

## 2023-02-14 PROCEDURE — 3008F BODY MASS INDEX DOCD: CPT | Mod: CPTII,S$GLB,, | Performed by: OBSTETRICS & GYNECOLOGY

## 2023-02-14 PROCEDURE — 3079F DIAST BP 80-89 MM HG: CPT | Mod: CPTII,S$GLB,, | Performed by: OBSTETRICS & GYNECOLOGY

## 2023-02-14 PROCEDURE — 3008F PR BODY MASS INDEX (BMI) DOCUMENTED: ICD-10-PCS | Mod: CPTII,S$GLB,, | Performed by: OBSTETRICS & GYNECOLOGY

## 2023-02-14 PROCEDURE — 3074F SYST BP LT 130 MM HG: CPT | Mod: CPTII,S$GLB,, | Performed by: OBSTETRICS & GYNECOLOGY

## 2023-02-14 PROCEDURE — 1160F PR REVIEW ALL MEDS BY PRESCRIBER/CLIN PHARMACIST DOCUMENTED: ICD-10-PCS | Mod: CPTII,S$GLB,, | Performed by: OBSTETRICS & GYNECOLOGY

## 2023-02-14 PROCEDURE — 1159F PR MEDICATION LIST DOCUMENTED IN MEDICAL RECORD: ICD-10-PCS | Mod: CPTII,S$GLB,, | Performed by: OBSTETRICS & GYNECOLOGY

## 2023-02-14 PROCEDURE — 99999 PR PBB SHADOW E&M-EST. PATIENT-LVL III: CPT | Mod: PBBFAC,,, | Performed by: OBSTETRICS & GYNECOLOGY

## 2023-02-14 PROCEDURE — 1160F RVW MEDS BY RX/DR IN RCRD: CPT | Mod: CPTII,S$GLB,, | Performed by: OBSTETRICS & GYNECOLOGY

## 2023-02-14 PROCEDURE — 3079F PR MOST RECENT DIASTOLIC BLOOD PRESSURE 80-89 MM HG: ICD-10-PCS | Mod: CPTII,S$GLB,, | Performed by: OBSTETRICS & GYNECOLOGY

## 2023-02-14 PROCEDURE — 99999 PR PBB SHADOW E&M-EST. PATIENT-LVL III: ICD-10-PCS | Mod: PBBFAC,,, | Performed by: OBSTETRICS & GYNECOLOGY

## 2023-02-14 PROCEDURE — 99396 PREV VISIT EST AGE 40-64: CPT | Mod: S$GLB,,, | Performed by: OBSTETRICS & GYNECOLOGY

## 2023-02-14 NOTE — PROGRESS NOTES
HPI:   57 y.o.   OB History          2    Para   2    Term   2            AB        Living   2         SAB        IAB        Ectopic        Multiple        Live Births   2              Patient's last menstrual period was 2013.    Patient is  here for her annual gynecologic exam.  She has no complaints.     ROS:  GENERAL: No fever, chills, fatigability or weight loss.  SKIN: No rashes, itching or changes in color or texture of skin.  HEAD: No headaches or recent head trauma.  EYES: Visual acuity fine. No photophobia, ocular pain or diplopia.  EARS: Denies ear pain, discharge or vertigo.  NOSE: No loss of smell, no epistaxis or postnasal drip.  MOUTH & THROAT: No hoarseness or change in voice. No excessive gum bleeding.  NODES: Denies swollen glands.  CHEST: Denies TUBBS, cyanosis, wheezing, cough and sputum production.  CARDIOVASCULAR: Denies chest pain, PND, orthopnea or reduced exercise tolerance.  ABDOMEN: Appetite fine. No weight loss. Denies diarrhea, abdominal pain, hematemesis or blood in stool.  URINARY: No flank pain, dysuria or hematuria.  PERIPHERAL VASCULAR: No claudication or cyanosis.  MUSCULOSKELETAL: No joint stiffness or swelling. Denies back pain.  NEUROLOGIC: No history of seizures, paralysis, alteration of gait or coordination.    PE:   /88   Wt 87.4 kg (192 lb 10.9 oz)   LMP 2013   BMI 31.10 kg/m²   APPEARANCE: Well nourished, well developed, in no acute distress.  NECK: Neck symmetric without masses or thyromegaly.  BREASTS: Symmetrical, no skin changes or visible lesions. No palpable masses, nipple discharge or adenopathy bilaterally.  ABDOMEN: Flat. Soft. No tenderness or masses. No hepatosplenomegaly. No hernias. No CVA tenderness.  VULVA: No lesions. Normal female genitalia.  URETHRAL MEATUS: Normal size and location, no lesions, no prolapse.  URETHRA: No masses, tenderness, prolapse or scarring.  VAGINA: Moist and well rugated, no discharge, no significant  cystocele or rectocele.  CERVIX: No lesions and discharge. PAP done.  UTERUS: Normal size, regular shape, mobile, non-tender, bladder base nontender.  ADNEXA: No masses, tenderness or CDS nodularity.  ANUS PERINEUM: Normal.    PROCEDURES:  Pap smear    Assessment:  Normal Gynecologic Exam    Plan:  Mammogram and Colonoscopy if indicated by current recommendations.  Return to clinic in one year or for any problems or complaints.

## 2023-02-24 LAB
CLINICAL INFO: ABNORMAL
CYTO CVX: ABNORMAL
CYTOLOGIST CVX/VAG CYTO: ABNORMAL
CYTOLOGIST CVX/VAG CYTO: ABNORMAL
CYTOLOGY CMNT CVX/VAG CYTO-IMP: ABNORMAL
CYTOLOGY PAP THIN PREP EXPLANATION: ABNORMAL
DATE OF PREVIOUS PAP: ABNORMAL
DATE PREVIOUS BX: NO
GEN CATEG CVX/VAG CYTO-IMP: ABNORMAL
LMP START DATE: ABNORMAL
MICROORGANISM CVX/VAG CYTO: ABNORMAL
PATHOLOGIST CVX/VAG CYTO: ABNORMAL
SERVICE CMNT-IMP: ABNORMAL
SPECIMEN SOURCE CVX/VAG CYTO: ABNORMAL
STAT OF ADQ CVX/VAG CYTO-IMP: ABNORMAL

## 2023-02-27 LAB — HPV I/H RISK 4 DNA CVX QL NAA+PROBE: NOT DETECTED

## 2023-02-28 ENCOUNTER — PATIENT MESSAGE (OUTPATIENT)
Dept: OBSTETRICS AND GYNECOLOGY | Facility: CLINIC | Age: 58
End: 2023-02-28
Payer: COMMERCIAL

## 2023-04-20 DIAGNOSIS — I70.203 ATHEROSCLEROSIS OF NATIVE ARTERY OF BOTH LOWER EXTREMITIES, WITH UNSPECIFIED PRESENCE OF CLINICAL MANIFESTATION: Primary | ICD-10-CM

## 2023-04-21 DIAGNOSIS — I70.209 ATHEROSCLEROSIS OF NATIVE ARTERY OF LOWER EXTREMITY, UNSPECIFIED LATERALITY, WITH UNSPECIFIED PRESENCE OF CLINICAL MANIFESTATION: Primary | ICD-10-CM

## 2023-08-04 ENCOUNTER — HOSPITAL ENCOUNTER (OUTPATIENT)
Dept: RADIOLOGY | Facility: HOSPITAL | Age: 58
Discharge: HOME | End: 2023-08-04
Attending: INTERNAL MEDICINE
Payer: COMMERCIAL

## 2023-08-04 DIAGNOSIS — I70.203 ATHEROSCLEROSIS OF NATIVE ARTERY OF BOTH LOWER EXTREMITIES, WITH UNSPECIFIED PRESENCE OF CLINICAL MANIFESTATION: ICD-10-CM

## 2023-08-04 PROCEDURE — 93925 LOWER EXTREMITY STUDY: CPT | Mod: 26,,, | Performed by: RADIOLOGY

## 2023-08-04 PROCEDURE — 93925 US LOWER EXTREMITY ARTERIES BILATERAL: ICD-10-PCS | Mod: 26,,, | Performed by: RADIOLOGY

## 2023-08-04 PROCEDURE — 93925 LOWER EXTREMITY STUDY: CPT | Mod: TC

## 2023-08-23 DIAGNOSIS — Z12.31 SCREENING MAMMOGRAM FOR BREAST CANCER: Primary | ICD-10-CM

## 2023-11-10 ENCOUNTER — HOSPITAL ENCOUNTER (OUTPATIENT)
Dept: RADIOLOGY | Facility: HOSPITAL | Age: 58
Discharge: HOME | End: 2023-11-10
Attending: INTERNAL MEDICINE
Payer: COMMERCIAL

## 2023-11-10 DIAGNOSIS — Z12.31 SCREENING MAMMOGRAM FOR BREAST CANCER: ICD-10-CM

## 2023-11-10 PROCEDURE — 77063 BREAST TOMOSYNTHESIS BI: CPT | Mod: 26,,, | Performed by: RADIOLOGY

## 2023-11-10 PROCEDURE — 77067 SCR MAMMO BI INCL CAD: CPT | Mod: TC

## 2023-11-10 PROCEDURE — 77067 MAMMO DIGITAL SCREENING BILAT WITH TOMO: ICD-10-PCS | Mod: 26,,, | Performed by: RADIOLOGY

## 2023-11-10 PROCEDURE — 77063 MAMMO DIGITAL SCREENING BILAT WITH TOMO: ICD-10-PCS | Mod: 26,,, | Performed by: RADIOLOGY

## 2023-11-10 PROCEDURE — 77067 SCR MAMMO BI INCL CAD: CPT | Mod: 26,,, | Performed by: RADIOLOGY

## 2024-01-25 ENCOUNTER — OFFICE VISIT (OUTPATIENT)
Dept: PODIATRY | Facility: CLINIC | Age: 59
End: 2024-01-25
Payer: COMMERCIAL

## 2024-01-25 VITALS
SYSTOLIC BLOOD PRESSURE: 181 MMHG | HEIGHT: 66 IN | BODY MASS INDEX: 30.86 KG/M2 | DIASTOLIC BLOOD PRESSURE: 82 MMHG | HEART RATE: 87 BPM | WEIGHT: 192 LBS

## 2024-01-25 DIAGNOSIS — B35.3 TINEA PEDIS OF LEFT FOOT: ICD-10-CM

## 2024-01-25 DIAGNOSIS — L97.512 FOOT ULCER, RIGHT, WITH FAT LAYER EXPOSED: Primary | ICD-10-CM

## 2024-01-25 DIAGNOSIS — B35.1 DERMATOPHYTOSIS OF NAIL: ICD-10-CM

## 2024-01-25 DIAGNOSIS — E11.49 TYPE II DIABETES MELLITUS WITH NEUROLOGICAL MANIFESTATIONS: ICD-10-CM

## 2024-01-25 PROCEDURE — 1160F RVW MEDS BY RX/DR IN RCRD: CPT | Mod: CPTII,S$GLB,, | Performed by: PODIATRIST

## 2024-01-25 PROCEDURE — 11721 DEBRIDE NAIL 6 OR MORE: CPT | Mod: 59,S$GLB,, | Performed by: PODIATRIST

## 2024-01-25 PROCEDURE — 99203 OFFICE O/P NEW LOW 30 MIN: CPT | Mod: 25,S$GLB,, | Performed by: PODIATRIST

## 2024-01-25 PROCEDURE — 3008F BODY MASS INDEX DOCD: CPT | Mod: CPTII,S$GLB,, | Performed by: PODIATRIST

## 2024-01-25 PROCEDURE — 99999 PR PBB SHADOW E&M-EST. PATIENT-LVL IV: CPT | Mod: PBBFAC,,, | Performed by: PODIATRIST

## 2024-01-25 PROCEDURE — 11042 DBRDMT SUBQ TIS 1ST 20SQCM/<: CPT | Mod: S$GLB,,, | Performed by: PODIATRIST

## 2024-01-25 PROCEDURE — 1159F MED LIST DOCD IN RCRD: CPT | Mod: CPTII,S$GLB,, | Performed by: PODIATRIST

## 2024-01-25 PROCEDURE — 3077F SYST BP >= 140 MM HG: CPT | Mod: CPTII,S$GLB,, | Performed by: PODIATRIST

## 2024-01-25 PROCEDURE — 3079F DIAST BP 80-89 MM HG: CPT | Mod: CPTII,S$GLB,, | Performed by: PODIATRIST

## 2024-01-25 RX ORDER — PRENATAL VIT 91/IRON/FOLIC/DHA 28-975-200
COMBINATION PACKAGE (EA) ORAL DAILY
Qty: 15 G | Refills: 3 | Status: SHIPPED | OUTPATIENT
Start: 2024-01-25

## 2024-01-25 RX ORDER — CLOTRIMAZOLE AND BETAMETHASONE DIPROPIONATE 10; .64 MG/G; MG/G
CREAM TOPICAL DAILY
Qty: 45 G | Refills: 1 | Status: SHIPPED | OUTPATIENT
Start: 2024-01-25 | End: 2024-06-19

## 2024-01-25 NOTE — PROGRESS NOTES
"Chief Complaint   Patient presents with    Diabetic Foot Exam    Nail Problem    Tinea Pedis     Patient present for diabetic foot care seen Dr Estes in the pass. Pt states feet dry.              MEDICAL DECISION MAKING:        ICD-10-CM ICD-9-CM    1. Foot ulcer, right, with fat layer exposed  L97.512 707.15       2. Tinea pedis of left foot  B35.3 110.4 clotrimazole-betamethasone 1-0.05% (LOTRISONE) cream      3. Type II diabetes mellitus with neurological manifestations  E11.49 250.60 DIABETIC SHOES FOR HOME USE      4. Dermatophytosis of nail  B35.1 110.1 terbinafine HCL (LAMISIL) 1 % cream      efinaconazole (JUBLIA) 10 % Jeffrey              I counseled the patient on the patient's conditions, their implications and medical management.   Shoe inspection.     Continue good nutrition and blood sugar control to help prevent podiatric complications of diabetes.   Maintain proper foot hygiene.   Continue wearing proper shoe gear, daily foot inspections, never walking without protective shoe gear, never putting sharp instruments to feet.  Wound evaluation and management .  Debridement and topical antibiotics, football dressing.  Leave clean, dry, and intact    Other prescription medications as ordered.   Follow up one week wound.       Wound Debridement    Date/Time: 1/25/2024 12:15 PM    Performed by: Elaine Leigh DPM  Authorized by: Elaine Leigh DPM    Time out: Immediately prior to procedure a "time out" was called to verify the correct patient, procedure, equipment, support staff and site/side marked as required.      Preparation: Patient was prepped and draped in usual sterile fashion    Local anesthesia used?: No      Wound Details:    Location:  Right foot    Location:  Right 5th Metatarsal Head    Type of Debridement:  Excisional       Length (cm):  0.5       Area (sq cm):  0.1       Width (cm):  0.2       Percent Debrided (%):  100       Depth (cm):  0.1       Total Area Debrided (sq cm):  0.1    Depth of " "debridement:  Subcutaneous tissue    Tissue debrided:  Epidermis, Subcutaneous and Dermis    Devitalized tissue debrided:  Slough, Fibrin, Callus and Biofilm    Instruments:  Blade  Bleeding:  None  Patient tolerance:  Patient tolerated the procedure well with no immediate complications           Routine Foot Care    Date/Time: 1/25/2024 12:15 PM    Performed by: Elaine Leigh DPM  Authorized by: Elaine Leigh DPM    Time out: Immediately prior to procedure a "time out" was called to verify the correct patient, procedure, equipment, support staff and site/side marked as required.      Nail Care Type:  Debride  Location(s): All  (Left 1st Toe, Left 3rd Toe, Left 2nd Toe, Left 4th Toe, Left 5th Toe, Right 1st Toe, Right 2nd Toe, Right 3rd Toe, Right 4th Toe and Right 5th Toe)  Patient tolerance:  Patient tolerated the procedure well with no immediate complications     With patient's permission, the toenails mentioned above were reduced and debrided using a nail nipper, removing offending nail and debris. The patient will continue to monitor the areas daily, inspect the feet, wear protective shoe gear when ambulatory, and moisturizer to maintain skin integrity.                 HPI:   The patient is a 58 y.o.  female  who presents for a diabetic foot exam.     Patient reports  presence of abnormal sensation to the feet .    History of diabetic foot ulcers:  years ago.  Has seen Dr. Estes in the past.    History of foot surgery: none.     Shoes worn today:  casual  Noted a blister right plantar foot a few days ago.  No pain.  She has neuropathy.  Also chronic rash left lateral foot.  She has been applying silvadene.  No improvement.  No trauma recalled.       The patient is under the Active Care of Tushar Pino MD for the qualifying diagnosis of diabetes mellitus.   Last encounter on:   8/23/2023        There is no problem list on file for this patient.          Current Outpatient Medications on File Prior to Visit " "  Medication Sig Dispense Refill    allopurinoL (ZYLOPRIM) 300 MG tablet Take 300 mg by mouth.      amitriptyline (ELAVIL) 25 MG tablet TK 1 T PO QD HS      ezetimibe (ZETIA) 10 mg tablet Take 10 mg by mouth.      FREESTYLE LITE STRIPS Strp TEST ONCE D      gabapentin (NEURONTIN) 800 MG tablet Take 800 mg by mouth 3 (three) times daily.      insulin glargine (LANTUS) 100 unit/mL injection Inject 15 Units into the skin once daily.      lisinopriL (PRINIVIL,ZESTRIL) 40 MG tablet TAKE 1 TABLET PO DAILY      metoprolol tartrate (LOPRESSOR) 25 MG tablet Take 25 mg by mouth 2 (two) times daily.      pravastatin (PRAVACHOL) 80 MG tablet TK 1 T PO QD  4    triamterene-hydrochlorothiazide 75-50 mg (MAXZIDE) 75-50 mg per tablet       econazole nitrate 1 % cream Apply topically 2 (two) times daily. 85 g 1     No current facility-administered medications on file prior to visit.           Review of patient's allergies indicates:  No Known Allergies          ROS:  General ROS: negative  Respiratory ROS: no cough, shortness of breath, or wheezing  Cardiovascular ROS: no chest pain or dyspnea on exertion  Musculoskeletal ROS: negative  Dermatological ROS: negative      LAST HbA1c: No results found for: "LABA1C", "HGBA1C"        EXAM:   Vitals:    01/25/24 1244   BP: (!) 181/82   Pulse: 87   Weight: 87.1 kg (192 lb)   Height: 5' 6" (1.676 m)       General: alert, no distress, cooperative    Vascular:   Dorsalis Pedis:  present     Posterior Tibial:  present  Capillary refill time:  3 seconds  Temperature of toes cool to touch  Edema:  none      Neurological:     Sharp touch:  decreased  Light touch: decreased  Tinels Sign:  Absent  Mulders Click:   Absent  [place]:  Present deficits to sharp/dull, light touch or vibratory sensation feet, ten points tested.    Present paresthesias (Abnormal spontaneous sensations in feet)        Dermatological:   Skin: thin and atrophic;  red rash lateral left foot.  No vesicles.  Superficial skin " fissures.   Hair growth:  decreased  Wounds/Ulcers:  Present;     Ulcer Location: right sub 5th metatarsal head   Measurements:   post debride:  0.5cm x 0.2cm x 0.1cm  Periwound: (+) hyperkeratosis;  (--) necrosis  Exudate: none  Edema:  none  Malodor:  Absent  Base:  100% granular; 0% fibrin.  0%  Eschar  Erythema:  none  Probe to bone: no     Bruising:  Absent  Erythema:  Absent  Toenails:   thickness:  thickened;   Yellowish in color,  with subungual debris.   Absent paronychia      Musculoskeletal:   Metatarsophalangeal range of motion:   diminished range of motion  Subtalar joint range of motion: full range of motion  Ankle joint range of motion:  diminished range of motion  Bunions:  Absent  Hammertoes: Present

## 2024-01-25 NOTE — PATIENT INSTRUCTIONS
How to Check Your Feet    Below are tips to help you look for foot problems. Try to check your feet at the same time each day, such as when you get out of bed in the morning.    Check the top of each foot. The tops of toes, back of the heel, and outer edge of the foot can get a lot of rubbing from poor-fitting shoes.    Check the bottom of each foot. Daily wear and tear often leads to problems at pressure spots.    Check the toes and nails. Fungal infections often occur between toes. Toenail problems can also be a sign of fungal infections or lead to breaks in the skin.    Check your shoes, too. Loose objects inside a shoe can injure the foot. Use your hand to feel inside your shoes for things like yudith, loose stitching, or rough areas that could irritate your skin.        Diabetic Foot Care    Diabetes can lead to a number of different foot complications. Fortunately, most of these complications can be prevented with a little extra foot care. If diabetes is not well controlled, the high blood sugar can cause damage to blood vessels and result in poor circulation to the foot. When the skin does not get enough blood flow, it becomes prone to pressure sores and ulcers, which heal slowly.  High blood sugar can also damage nerves, interfering with the ability to feel pain and pressure. When you cant feel your foot normally, it is easy to injure your skin, bones and joints without knowing it. For these reasons diabetes increases the risk of fungal infections, bunions and ulcers. Deep ulcers can lead to bone infection. Gangrene is the most serious foot complication of diabetes. It usually occurs on the tips of the toes as blacked areas of skin. The black area is dead tissue. In severe cases, gangrene spreads to involve the entire toe, other toes and the entire foot. Foot or toe amputation may be required. Good foot care and blood sugar control can prevent this.    Home Care  Wear comfortable, proper fitting  shoes.  Wash your feet daily with warm water and mild soap.  After drying, apply a moisturizing cream or lotion.  Check your feet daily for skin breaks, blisters, swelling, or redness. Look between your toes also.  Wear cotton socks and change them every day.  Trim toe nails carefully and do not cut your cuticles.  Strive to keep your blood sugar under control with a combination of medicines, diet and activity.  If you smoke and have diabetes, it is very important that you stop. Smoking reduces blood flow to your foot.  Avoid activities that increase your risk of foot injury:  Do not walk barefoot.  Do not use heating pads or hot water bottles on your feet.  Do not put your foot in a hot tub without first checking the temperature with your hand.  10) Schedule yearly foot exams.    Follow Up  with your doctor or as advised by our staff. Report any cut, puncture, scrape, other injury, blister, ingrown toenail or ulcer on your foot.    Get Prompt Medical Attention  if any of the following occur:  -- Open ulcer with pus draining from the wound  -- Increasing foot or leg pain  -- New areas of redness or swelling or tender areas of the foot    © 2850-2519 [employer]. [address] [place] [address], [place] [address]. All rights reserved. This information is not intended as a substitute for professional medical care. Always follow your healthcare professional's instructions.

## 2024-02-01 ENCOUNTER — OFFICE VISIT (OUTPATIENT)
Dept: PODIATRY | Facility: CLINIC | Age: 59
End: 2024-02-01
Payer: COMMERCIAL

## 2024-02-01 VITALS
WEIGHT: 192 LBS | HEART RATE: 81 BPM | SYSTOLIC BLOOD PRESSURE: 165 MMHG | HEIGHT: 66 IN | DIASTOLIC BLOOD PRESSURE: 85 MMHG | BODY MASS INDEX: 30.86 KG/M2

## 2024-02-01 DIAGNOSIS — E11.49 TYPE II DIABETES MELLITUS WITH NEUROLOGICAL MANIFESTATIONS: ICD-10-CM

## 2024-02-01 DIAGNOSIS — Z87.2 HEALED ULCER OF RIGHT FOOT ON EXAMINATION: Primary | ICD-10-CM

## 2024-02-01 PROCEDURE — 3077F SYST BP >= 140 MM HG: CPT | Mod: CPTII,S$GLB,, | Performed by: PODIATRIST

## 2024-02-01 PROCEDURE — 99212 OFFICE O/P EST SF 10 MIN: CPT | Mod: S$GLB,,, | Performed by: PODIATRIST

## 2024-02-01 PROCEDURE — 3008F BODY MASS INDEX DOCD: CPT | Mod: CPTII,S$GLB,, | Performed by: PODIATRIST

## 2024-02-01 PROCEDURE — 3079F DIAST BP 80-89 MM HG: CPT | Mod: CPTII,S$GLB,, | Performed by: PODIATRIST

## 2024-02-01 PROCEDURE — 99999 PR PBB SHADOW E&M-EST. PATIENT-LVL IV: CPT | Mod: PBBFAC,,, | Performed by: PODIATRIST

## 2024-02-01 PROCEDURE — 1160F RVW MEDS BY RX/DR IN RCRD: CPT | Mod: CPTII,S$GLB,, | Performed by: PODIATRIST

## 2024-02-01 PROCEDURE — 1159F MED LIST DOCD IN RCRD: CPT | Mod: CPTII,S$GLB,, | Performed by: PODIATRIST

## 2024-02-01 NOTE — PROGRESS NOTES
Chief Complaint   Patient presents with    Foot Ulcer     Patient returning to clinic for wound care right foot.           MEDICAL DECISION MAKING:      ICD-10-CM ICD-9-CM    1. Healed ulcer of right foot on examination  Z87.2 V13.3       2. Type II diabetes mellitus with neurological manifestations  E11.49 250.60           I counseled the patient on the patient's conditions, their implications and medical management.   Wound evaluation.  Appears epithelialized.  No open wounds or redness.    Moisturize feet daily.  Shoe and activity modification as needed for relief.   Continue good nutrition and blood sugar control to help prevent podiatric complications of diabetes.   Continue wearing proper shoe gear, daily foot inspections, never walking without protective shoe gear, never putting sharp instruments to feet.  Foot exam 2 months or sooner if concerned.        I spent a total of 18 minutes on the day of the visit.  This includes face to face time and non-face to face time preparing to see the patient (eg, review of tests), obtaining and/or reviewing separately obtained history, documenting clinical information in the electronic or other health record, independently interpreting results and communicating results to the patient/family/caregiver, or care coordinator.          HPI:   The patient is a 58 y.o.  female  who presents for Foot Ulcer (Patient returning to clinic for wound care right foot. )    In football dressing.  No issues.            There is no problem list on file for this patient.          Current Outpatient Medications on File Prior to Visit   Medication Sig Dispense Refill    allopurinoL (ZYLOPRIM) 300 MG tablet Take 300 mg by mouth.      amitriptyline (ELAVIL) 25 MG tablet TK 1 T PO QD HS      clotrimazole-betamethasone 1-0.05% (LOTRISONE) cream Apply topically once daily. To left foot. 45 g 1    efinaconazole (JUBLIA) 10 % Jeffrey Apply 1 Application topically once daily. To toenails 8 mL 6     "ezetimibe (ZETIA) 10 mg tablet Take 10 mg by mouth.      FREESTYLE LITE STRIPS Strp TEST ONCE D      gabapentin (NEURONTIN) 800 MG tablet Take 800 mg by mouth 3 (three) times daily.      insulin glargine (LANTUS) 100 unit/mL injection Inject 15 Units into the skin once daily.      lisinopriL (PRINIVIL,ZESTRIL) 40 MG tablet TAKE 1 TABLET PO DAILY      metoprolol tartrate (LOPRESSOR) 25 MG tablet Take 25 mg by mouth 2 (two) times daily.      pravastatin (PRAVACHOL) 80 MG tablet TK 1 T PO QD  4    terbinafine HCL (LAMISIL) 1 % cream Apply topically Daily. To affected toenails. 15 g 3    triamterene-hydrochlorothiazide 75-50 mg (MAXZIDE) 75-50 mg per tablet       econazole nitrate 1 % cream Apply topically 2 (two) times daily. 85 g 1     No current facility-administered medications on file prior to visit.           Review of patient's allergies indicates:  No Known Allergies          ROS:  General ROS: negative  Respiratory ROS: no cough, shortness of breath, or wheezing  Cardiovascular ROS: no chest pain or dyspnea on exertion  Musculoskeletal ROS: negative  Dermatological ROS: negative      LAST HbA1c: No results found for: "LABA1C", "HGBA1C"        EXAM:   Vitals:    02/01/24 1309   BP: (!) 165/85   Pulse: 81   Weight: 87.1 kg (192 lb)   Height: 5' 6" (1.676 m)       General: alert, no distress, cooperative    Vascular:   Dorsalis Pedis:  present     Posterior Tibial:  present  Capillary refill time:  3 seconds  Temperature of toes cool to touch  Edema:  none      Neurological:     Sharp touch:  decreased  Light touch: decreased  Tinels Sign:  Absent  Mulders Click:   Absent  Parish:  Present deficits to sharp/dull, light touch or vibratory sensation feet, ten points tested.    Present paresthesias (Abnormal spontaneous sensations in feet)        Dermatological:   Skin: thin and atrophic;  red rash lateral left foot.  No vesicles.  Superficial skin fissures.   Hair growth:  decreased  Wounds/Ulcers:  absent ;  Ulcer " Location: right sub 5th metatarsal head   Measurements:  epithelialized     Bruising:  Absent  Erythema:  Absent      Musculoskeletal:   Metatarsophalangeal range of motion:   diminished range of motion  Subtalar joint range of motion: full range of motion  Ankle joint range of motion:  diminished range of motion  Bunions:  Absent  Hammertoes: Present

## 2024-03-19 ENCOUNTER — OFFICE VISIT (OUTPATIENT)
Dept: OBSTETRICS AND GYNECOLOGY | Facility: CLINIC | Age: 59
End: 2024-03-19
Payer: COMMERCIAL

## 2024-03-19 VITALS
WEIGHT: 195.38 LBS | DIASTOLIC BLOOD PRESSURE: 88 MMHG | BODY MASS INDEX: 31.54 KG/M2 | SYSTOLIC BLOOD PRESSURE: 159 MMHG

## 2024-03-19 DIAGNOSIS — Z12.4 SCREENING FOR CERVICAL CANCER: ICD-10-CM

## 2024-03-19 DIAGNOSIS — R10.2 PELVIC PAIN IN FEMALE: ICD-10-CM

## 2024-03-19 DIAGNOSIS — Z01.419 WELL WOMAN EXAM WITH ROUTINE GYNECOLOGICAL EXAM: Primary | ICD-10-CM

## 2024-03-19 PROCEDURE — 99999 PR PBB SHADOW E&M-EST. PATIENT-LVL III: CPT | Mod: PBBFAC,,, | Performed by: OBSTETRICS & GYNECOLOGY

## 2024-03-19 PROCEDURE — 99396 PREV VISIT EST AGE 40-64: CPT | Mod: S$GLB,,, | Performed by: OBSTETRICS & GYNECOLOGY

## 2024-03-19 PROCEDURE — 1160F RVW MEDS BY RX/DR IN RCRD: CPT | Mod: CPTII,S$GLB,, | Performed by: OBSTETRICS & GYNECOLOGY

## 2024-03-19 PROCEDURE — 1159F MED LIST DOCD IN RCRD: CPT | Mod: CPTII,S$GLB,, | Performed by: OBSTETRICS & GYNECOLOGY

## 2024-03-19 PROCEDURE — 3008F BODY MASS INDEX DOCD: CPT | Mod: CPTII,S$GLB,, | Performed by: OBSTETRICS & GYNECOLOGY

## 2024-03-19 PROCEDURE — 3079F DIAST BP 80-89 MM HG: CPT | Mod: CPTII,S$GLB,, | Performed by: OBSTETRICS & GYNECOLOGY

## 2024-03-19 PROCEDURE — 3077F SYST BP >= 140 MM HG: CPT | Mod: CPTII,S$GLB,, | Performed by: OBSTETRICS & GYNECOLOGY

## 2024-03-19 PROCEDURE — 88175 CYTOPATH C/V AUTO FLUID REDO: CPT | Performed by: OBSTETRICS & GYNECOLOGY

## 2024-03-19 NOTE — PROGRESS NOTES
HPI:   58 y.o.   OB History          2    Para   2    Term   2            AB        Living   2         SAB        IAB        Ectopic        Multiple        Live Births   2              Patient's last menstrual period was 2013.    Patient is  here for her annual gynecologic exam.  She has no complaints.     ROS:  GENERAL: No fever, chills, fatigability or weight loss.  SKIN: No rashes, itching or changes in color or texture of skin.  HEAD: No headaches or recent head trauma.  EYES: Visual acuity fine. No photophobia, ocular pain or diplopia.  EARS: Denies ear pain, discharge or vertigo.  NOSE: No loss of smell, no epistaxis or postnasal drip.  MOUTH & THROAT: No hoarseness or change in voice. No excessive gum bleeding.  NODES: Denies swollen glands.  CHEST: Denies TUBBS, cyanosis, wheezing, cough and sputum production.  CARDIOVASCULAR: Denies chest pain, PND, orthopnea or reduced exercise tolerance.  ABDOMEN: Appetite fine. No weight loss. Denies diarrhea, abdominal pain, hematemesis or blood in stool.  URINARY: No flank pain, dysuria or hematuria.  PERIPHERAL VASCULAR: No claudication or cyanosis.  MUSCULOSKELETAL: No joint stiffness or swelling. Denies back pain.  NEUROLOGIC: No history of seizures, paralysis, alteration of gait or coordination.    PE:   BP (!) 159/88   Wt 88.6 kg (195 lb 6.4 oz)   LMP 2013   BMI 31.54 kg/m²   APPEARANCE: Well nourished, well developed, in no acute distress.  NECK: Neck symmetric without masses or thyromegaly.  BREASTS: Symmetrical, no skin changes or visible lesions. No palpable masses, nipple discharge or adenopathy bilaterally.  ABDOMEN: Flat. Soft. No tenderness or masses. No hepatosplenomegaly. No hernias. No CVA tenderness.  VULVA: No lesions. Normal female genitalia.  URETHRAL MEATUS: Normal size and location, no lesions, no prolapse.  URETHRA: No masses, tenderness, prolapse or scarring.  VAGINA: Moist and well rugated, no discharge, no  significant cystocele or rectocele.  CERVIX: No lesions and discharge. PAP done.  UTERUS: Normal size, regular shape, mobile, non-tender, bladder base nontender.  ADNEXA: No masses, tenderness or CDS nodularity.  ANUS PERINEUM: Normal.    PROCEDURES:  Pap smear    Assessment:  Normal Gynecologic Exam    Plan:  Mammogram and Colonoscopy if indicated by current recommendations.  Return to clinic in one year or for any problems or complaints.  No gyn co

## 2024-06-16 ENCOUNTER — HOSPITAL ENCOUNTER (EMERGENCY)
Facility: HOSPITAL | Age: 59
Discharge: HOME | End: 2024-06-17
Attending: STUDENT IN AN ORGANIZED HEALTH CARE EDUCATION/TRAINING PROGRAM
Payer: COMMERCIAL

## 2024-06-16 DIAGNOSIS — M79.5 FOREIGN BODY (FB) IN SOFT TISSUE: ICD-10-CM

## 2024-06-16 DIAGNOSIS — L03.119 CELLULITIS IN DIABETIC FOOT: Primary | ICD-10-CM

## 2024-06-16 DIAGNOSIS — R50.9 FEVER, UNSPECIFIED FEVER CAUSE: ICD-10-CM

## 2024-06-16 DIAGNOSIS — E11.628 CELLULITIS IN DIABETIC FOOT: Primary | ICD-10-CM

## 2024-06-16 LAB
BACTERIA #/AREA URNS HPF: ABNORMAL /HPF
BASOPHILS # BLD AUTO: 0.04 K/UL (ref 0–0.2)
BASOPHILS NFR BLD: 0.2 % (ref 0–1.9)
BILIRUB UR QL STRIP: NEGATIVE
CLARITY UR: CLEAR
COLOR UR: COLORLESS
DIFFERENTIAL METHOD BLD: ABNORMAL
EOSINOPHIL # BLD AUTO: 0.1 K/UL (ref 0–0.5)
EOSINOPHIL NFR BLD: 0.6 % (ref 0–8)
ERYTHROCYTE [DISTWIDTH] IN BLOOD BY AUTOMATED COUNT: 13.1 % (ref 11.5–14.5)
FIO2: 21 %
GLUCOSE UR QL STRIP: ABNORMAL
HCT VFR BLD AUTO: 36.9 % (ref 37–48.5)
HGB BLD-MCNC: 12.2 G/DL (ref 12–16)
HGB UR QL STRIP: ABNORMAL
IMM GRANULOCYTES # BLD AUTO: 0.07 K/UL (ref 0–0.04)
IMM GRANULOCYTES NFR BLD AUTO: 0.4 % (ref 0–0.5)
KETONES UR QL STRIP: NEGATIVE
LACTATE SERPL-SCNC: 2 MMOL/L (ref 0.5–2.2)
LDH SERPL L TO P-CCNC: 1.9 MMOL/L (ref 0.5–2.2)
LEUKOCYTE ESTERASE UR QL STRIP: ABNORMAL
LYMPHOCYTES # BLD AUTO: 2.8 K/UL (ref 1–4.8)
LYMPHOCYTES NFR BLD: 17.2 % (ref 18–48)
MCH RBC QN AUTO: 29.8 PG (ref 27–31)
MCHC RBC AUTO-ENTMCNC: 33.1 G/DL (ref 32–36)
MCV RBC AUTO: 90 FL (ref 82–98)
MICROSCOPIC COMMENT: ABNORMAL
MONOCYTES # BLD AUTO: 1.4 K/UL (ref 0.3–1)
MONOCYTES NFR BLD: 8.8 % (ref 4–15)
NEUTROPHILS # BLD AUTO: 11.8 K/UL (ref 1.8–7.7)
NEUTROPHILS NFR BLD: 72.8 % (ref 38–73)
NITRITE UR QL STRIP: NEGATIVE
NRBC BLD-RTO: 0 /100 WBC
PH UR STRIP: 5 [PH] (ref 5–8)
PLATELET # BLD AUTO: 328 K/UL (ref 150–450)
PMV BLD AUTO: 10.7 FL (ref 9.2–12.9)
POC PERFORMED BY: NORMAL
PROT UR QL STRIP: NEGATIVE
RBC # BLD AUTO: 4.1 M/UL (ref 4–5.4)
RBC #/AREA URNS HPF: 3 /HPF (ref 0–4)
SARS-COV-2 RDRP RESP QL NAA+PROBE: NEGATIVE
SP GR UR STRIP: >1.03 (ref 1–1.03)
SPECIMEN SOURCE: NORMAL
SQUAMOUS #/AREA URNS HPF: 10 /HPF
URN SPEC COLLECT METH UR: ABNORMAL
UROBILINOGEN UR STRIP-ACNC: NEGATIVE EU/DL
WBC # BLD AUTO: 16.18 K/UL (ref 3.9–12.7)
WBC #/AREA URNS HPF: 25 /HPF (ref 0–5)
YEAST URNS QL MICRO: ABNORMAL

## 2024-06-16 PROCEDURE — 80053 COMPREHEN METABOLIC PANEL: CPT | Performed by: STUDENT IN AN ORGANIZED HEALTH CARE EDUCATION/TRAINING PROGRAM

## 2024-06-16 PROCEDURE — 99285 EMERGENCY DEPT VISIT HI MDM: CPT | Mod: 25

## 2024-06-16 PROCEDURE — 81000 URINALYSIS NONAUTO W/SCOPE: CPT | Performed by: STUDENT IN AN ORGANIZED HEALTH CARE EDUCATION/TRAINING PROGRAM

## 2024-06-16 PROCEDURE — 83605 ASSAY OF LACTIC ACID: CPT | Performed by: STUDENT IN AN ORGANIZED HEALTH CARE EDUCATION/TRAINING PROGRAM

## 2024-06-16 PROCEDURE — 85025 COMPLETE CBC W/AUTO DIFF WBC: CPT | Performed by: STUDENT IN AN ORGANIZED HEALTH CARE EDUCATION/TRAINING PROGRAM

## 2024-06-16 PROCEDURE — 87186 SC STD MICRODIL/AGAR DIL: CPT | Performed by: STUDENT IN AN ORGANIZED HEALTH CARE EDUCATION/TRAINING PROGRAM

## 2024-06-16 PROCEDURE — 87040 BLOOD CULTURE FOR BACTERIA: CPT | Performed by: STUDENT IN AN ORGANIZED HEALTH CARE EDUCATION/TRAINING PROGRAM

## 2024-06-16 PROCEDURE — 99900035 HC TECH TIME PER 15 MIN (STAT)

## 2024-06-16 PROCEDURE — 25000003 PHARM REV CODE 250: Performed by: STUDENT IN AN ORGANIZED HEALTH CARE EDUCATION/TRAINING PROGRAM

## 2024-06-16 PROCEDURE — 87088 URINE BACTERIA CULTURE: CPT | Performed by: STUDENT IN AN ORGANIZED HEALTH CARE EDUCATION/TRAINING PROGRAM

## 2024-06-16 PROCEDURE — 96365 THER/PROPH/DIAG IV INF INIT: CPT

## 2024-06-16 PROCEDURE — 90715 TDAP VACCINE 7 YRS/> IM: CPT | Performed by: STUDENT IN AN ORGANIZED HEALTH CARE EDUCATION/TRAINING PROGRAM

## 2024-06-16 PROCEDURE — 83605 ASSAY OF LACTIC ACID: CPT

## 2024-06-16 PROCEDURE — 87086 URINE CULTURE/COLONY COUNT: CPT | Performed by: STUDENT IN AN ORGANIZED HEALTH CARE EDUCATION/TRAINING PROGRAM

## 2024-06-16 PROCEDURE — U0002 COVID-19 LAB TEST NON-CDC: HCPCS | Performed by: STUDENT IN AN ORGANIZED HEALTH CARE EDUCATION/TRAINING PROGRAM

## 2024-06-16 PROCEDURE — 87077 CULTURE AEROBIC IDENTIFY: CPT | Performed by: STUDENT IN AN ORGANIZED HEALTH CARE EDUCATION/TRAINING PROGRAM

## 2024-06-16 PROCEDURE — 90471 IMMUNIZATION ADMIN: CPT | Performed by: STUDENT IN AN ORGANIZED HEALTH CARE EDUCATION/TRAINING PROGRAM

## 2024-06-16 PROCEDURE — 63600175 PHARM REV CODE 636 W HCPCS: Performed by: STUDENT IN AN ORGANIZED HEALTH CARE EDUCATION/TRAINING PROGRAM

## 2024-06-16 RX ORDER — SULFAMETHOXAZOLE AND TRIMETHOPRIM 800; 160 MG/1; MG/1
1 TABLET ORAL
Status: COMPLETED | OUTPATIENT
Start: 2024-06-16 | End: 2024-06-16

## 2024-06-16 RX ORDER — LEVOFLOXACIN 5 MG/ML
750 INJECTION, SOLUTION INTRAVENOUS
Status: DISCONTINUED | OUTPATIENT
Start: 2024-06-16 | End: 2024-06-17 | Stop reason: HOSPADM

## 2024-06-16 RX ORDER — SODIUM CHLORIDE 9 MG/ML
1000 INJECTION, SOLUTION INTRAVENOUS
Status: COMPLETED | OUTPATIENT
Start: 2024-06-16 | End: 2024-06-17

## 2024-06-16 RX ORDER — ACETAMINOPHEN 500 MG
1000 TABLET ORAL
Status: COMPLETED | OUTPATIENT
Start: 2024-06-16 | End: 2024-06-16

## 2024-06-16 RX ADMIN — SULFAMETHOXAZOLE AND TRIMETHOPRIM 1 TABLET: 800; 160 TABLET ORAL at 10:06

## 2024-06-16 RX ADMIN — TETANUS TOXOID, REDUCED DIPHTHERIA TOXOID AND ACELLULAR PERTUSSIS VACCINE, ADSORBED 0.5 ML: 5; 2.5; 8; 8; 2.5 SUSPENSION INTRAMUSCULAR at 11:06

## 2024-06-16 RX ADMIN — ACETAMINOPHEN 1000 MG: 500 TABLET ORAL at 11:06

## 2024-06-16 RX ADMIN — SODIUM CHLORIDE 1000 ML: 9 INJECTION, SOLUTION INTRAVENOUS at 11:06

## 2024-06-16 RX ADMIN — LEVOFLOXACIN 750 MG: 5 INJECTION, SOLUTION INTRAVENOUS at 11:06

## 2024-06-17 VITALS
SYSTOLIC BLOOD PRESSURE: 141 MMHG | WEIGHT: 182 LBS | RESPIRATION RATE: 18 BRPM | TEMPERATURE: 100 F | DIASTOLIC BLOOD PRESSURE: 71 MMHG | HEART RATE: 77 BPM | OXYGEN SATURATION: 97 % | BODY MASS INDEX: 29.25 KG/M2 | HEIGHT: 66 IN

## 2024-06-17 LAB
ALBUMIN SERPL BCP-MCNC: 4.1 G/DL (ref 3.5–5.2)
ALP SERPL-CCNC: 80 U/L (ref 55–135)
ALT SERPL W/O P-5'-P-CCNC: 13 U/L (ref 10–44)
ANION GAP SERPL CALC-SCNC: 13 MMOL/L (ref 8–16)
AST SERPL-CCNC: 15 U/L (ref 10–40)
BILIRUB SERPL-MCNC: 0.5 MG/DL (ref 0.1–1)
BUN SERPL-MCNC: 20 MG/DL (ref 6–20)
CALCIUM SERPL-MCNC: 10 MG/DL (ref 8.7–10.5)
CHLORIDE SERPL-SCNC: 103 MMOL/L (ref 95–110)
CO2 SERPL-SCNC: 22 MMOL/L (ref 23–29)
CREAT SERPL-MCNC: 1.1 MG/DL (ref 0.5–1.4)
EST. GFR  (NO RACE VARIABLE): 58 ML/MIN/1.73 M^2
GLUCOSE SERPL-MCNC: 258 MG/DL (ref 70–110)
LACTATE SERPL-SCNC: 1.2 MMOL/L (ref 0.5–2.2)
POTASSIUM SERPL-SCNC: 4.5 MMOL/L (ref 3.5–5.1)
PROT SERPL-MCNC: 7.7 G/DL (ref 6–8.4)
SODIUM SERPL-SCNC: 138 MMOL/L (ref 136–145)

## 2024-06-17 PROCEDURE — 96366 THER/PROPH/DIAG IV INF ADDON: CPT

## 2024-06-17 PROCEDURE — 25500020 PHARM REV CODE 255: Performed by: STUDENT IN AN ORGANIZED HEALTH CARE EDUCATION/TRAINING PROGRAM

## 2024-06-17 PROCEDURE — 83605 ASSAY OF LACTIC ACID: CPT | Performed by: STUDENT IN AN ORGANIZED HEALTH CARE EDUCATION/TRAINING PROGRAM

## 2024-06-17 RX ORDER — SULFAMETHOXAZOLE AND TRIMETHOPRIM 800; 160 MG/1; MG/1
1 TABLET ORAL 2 TIMES DAILY
Qty: 14 TABLET | Refills: 0 | Status: SHIPPED | OUTPATIENT
Start: 2024-06-17 | End: 2024-06-17

## 2024-06-17 RX ORDER — SULFAMETHOXAZOLE AND TRIMETHOPRIM 800; 160 MG/1; MG/1
1 TABLET ORAL 2 TIMES DAILY
Qty: 14 TABLET | Refills: 0 | Status: SHIPPED | OUTPATIENT
Start: 2024-06-17 | End: 2024-06-24

## 2024-06-17 RX ORDER — LEVOFLOXACIN 750 MG/1
750 TABLET ORAL DAILY
Qty: 7 TABLET | Refills: 0 | Status: SHIPPED | OUTPATIENT
Start: 2024-06-17

## 2024-06-17 RX ORDER — LEVOFLOXACIN 750 MG/1
750 TABLET ORAL DAILY
Qty: 7 TABLET | Refills: 0 | Status: SHIPPED | OUTPATIENT
Start: 2024-06-17 | End: 2024-06-17

## 2024-06-17 RX ADMIN — IOHEXOL 100 ML: 350 INJECTION, SOLUTION INTRAVENOUS at 01:06

## 2024-06-17 NOTE — DISCHARGE INSTRUCTIONS
Follow-up with your podiatrist through [facility] by calling this morning for appointment this week for wound check follow up.  Use medications as prescribed.  These are strong antibiotics.  While taking the Levaquin, and for the next week after taking the as, do not participate in strenuous physical activity, as this can lead to tendon rupture.  These are strong antibiotics, while taking them please also take a daily over-the-counter probiotic available in most pharmacies.    Return to the emergency department if condition worsens in any way.

## 2024-06-17 NOTE — ED PROVIDER NOTES
Encounter Date: 2024       History     Chief Complaint   Patient presents with    Foot Injury     Patient presents to ED with c/o noticing she had unknowingly stepped on a arsh nail last night around 1800. Reports hx of DMII. Unknown date of last tetanus shot. Site appears small, round, and dark colored with yellow tissue surrounding wound bed. Patient also reports having fever of 100.4 tonight + fever and chills. States taking tylenol 1 hour PTA. Temp 99.7 in triage, VSS. Patient ambulatory.      HPI  58-year-old female with history of diabetes and diabetic neuropathy who largely can not feel the bottom of her feet presents brought in by self through triage after getting a nail through her sandal yesterday.  States that she was walking when a nail went in to her foot through her sandal, she does not exactly when it happened although when she came back inside she heard tapping on the ground with walking, this is how she knew it was there, and took effort to pry the shoe offer foot.  That over the course of the day since, there has been progressively worsening redness in the area, and that she had a fever T-max 100.4° at home.  Denies cough, nasal congestion, myalgias, or any other systemic or infectious symptoms or other acute complaints  Review of patient's allergies indicates:  No Known Allergies  Past Medical History:   Diagnosis Date    Abnormal Pap smear     Hx of appendectomy     Hypertension      Past Surgical History:   Procedure Laterality Date     SECTION      DILATION AND CURETTAGE OF UTERUS      TUBAL LIGATION       Family History   Problem Relation Name Age of Onset    Diabetes Father      Hypertension Father      Diabetes Mother      Hypertension Mother      Ovarian cancer Mother      Cancer Mother      Hypertension Brother      Cancer Sister          skin     Social History     Tobacco Use    Smoking status: Never     Passive exposure: Never    Smokeless tobacco: Never   Substance  Use Topics    Alcohol use: Yes     Comment: socially    Drug use: Never   Medical,  surgical, family, and social history reviewed and considered medical decision-making  Review of Systems  Complete review of systems was conducted and was negative except as per HPI and as marked    Physical Exam     Initial Vitals [06/16/24 2121]   BP Pulse Resp Temp SpO2   (!) 152/68 99 18 99.7 °F (37.6 °C) 96 %      MAP       --         Physical Exam  Physical:  General: Awake, alert, no acute distress  Head: Normocephalic, atraumatic  Neck: Trachea midline, full range of motion, no meningismus  ENT: Atraumatic, Airway Patent  Cardio: Regular Rate, Regular Rhythm, skin perfusion normal  Chest: Atraumatic, No respiratory distress no use of accessory muscles to breath, normal rate  Upper Ext: Atraumatic, Inspection normal, no swelling  Lower Ext:  Right lower extremity, on the plantar surface of the foot there is a single puncture wound between the 4th and 5th metatarsal, there is ruby cellulitis surrounding it extending to the mid foot, into the dorsum of the foot as well.  No crepitus or purulence.    Neuro: No gross cranial nerve abnormality, no lateralization, no gross sensory or motor deficits  ED Course   Procedures  Labs Reviewed   CBC W/ AUTO DIFFERENTIAL - Abnormal; Notable for the following components:       Result Value    WBC 16.18 (*)     Hematocrit 36.9 (*)     Gran # (ANC) 11.8 (*)     Immature Grans (Abs) 0.07 (*)     Mono # 1.4 (*)     Lymph % 17.2 (*)     All other components within normal limits   COMPREHENSIVE METABOLIC PANEL - Abnormal; Notable for the following components:    CO2 22 (*)     Glucose 258 (*)     eGFR 58 (*)     All other components within normal limits   URINALYSIS, REFLEX TO URINE CULTURE - Abnormal; Notable for the following components:    Color, UA Colorless (*)     Specific Gravity, UA >1.030 (*)     Glucose, UA 4+ (*)     Occult Blood UA Trace (*)     Leukocytes, UA 2+ (*)     All other  components within normal limits    Narrative:     Specimen Source->Urine   URINALYSIS MICROSCOPIC - Abnormal; Notable for the following components:    WBC, UA 25 (*)     Bacteria Moderate (*)     All other components within normal limits    Narrative:     Specimen Source->Urine   CULTURE, BLOOD   CULTURE, BLOOD   CULTURE, URINE   LACTIC ACID, PLASMA   SARS-COV-2 RNA AMPLIFICATION, QUAL   LACTIC ACID, PLASMA          Imaging Results              CT Foot With Contrast Right (Final result)  Result time 06/17/24 01:57:24      Final result by Moses Garrett DO (06/17/24 01:57:24)                   Impression:      Small soft tissue defect/laceration overlying the 5th metatarsophalangeal joint with mild underlying soft tissue edema, correlate for cellulitis.  No abscess, soft tissue gas, or CT evidence of acute osteomyelitis.      Electronically signed by: Moses Garrett  Date:    06/17/2024  Time:    01:57               Narrative:    EXAMINATION:  CT FOOT WITH CONTRAST RIGHT    CLINICAL HISTORY:  cellulitis, r/o gasforming infection;    TECHNIQUE:  Axial CT images of the right foot with sagittal and coronal reformats after the intravenous administration of 100 mL Omnipaque 350.    COMPARISON:  Radiographs from 06/16/2024.    FINDINGS:  Bones are intact.  There is no acute fracture or dislocation.  Alignment is normal.  There is no CT evidence of acute osteomyelitis.  There is joint space narrowing of the great toe metatarsophalangeal joint, mild, with marginal osteophytes.  There is a small cutaneous soft tissue defect of the lateral aspect of the foot, overlying the 5th metacarpophalangeal joint, with mild underlying soft tissue edema.  There is no evidence of soft tissue gas or evidence of a rim enhancing fluid collection or abscess.  There is no evidence of a radiopaque foreign body.  There is mild dorsal soft tissue edema of the forefoot.  There is moderate fatty atrophy of the visualized musculature.  There are  vascular calcifications.                                       X-Ray Foot Complete Right (Final result)  Result time 06/16/24 22:47:45      Final result by Moses Garrett DO (06/16/24 22:47:45)                   Impression:      No radiopaque foreign body.      Electronically signed by: Moses Garrett  Date:    06/16/2024  Time:    22:47               Narrative:    EXAMINATION:  XR FOOT COMPLETE 3 VIEW RIGHT    CLINICAL HISTORY:  . Residual foreign body in soft tissue    TECHNIQUE:  AP, lateral, and oblique views of the right foot were performed.    COMPARISON:  None    FINDINGS:  No acute fracture or dislocation. Alignment is normal. The Lisfranc articulation is congruent. There is joint space narrowing of the great toe metatarsophalangeal joint with marginal osteophytes.  There are vascular calcifications.  There is no radiopaque foreign body in the field.                                       Medications   0.9%  NaCl infusion (0 mLs Intravenous Stopped 6/17/24 0146)   acetaminophen tablet 1,000 mg (1,000 mg Oral Given 6/16/24 2308)   sulfamethoxazole-trimethoprim 800-160mg per tablet 1 tablet (1 tablet Oral Given 6/16/24 2258)   Tdap (BOOSTRIX) vaccine injection 0.5 mL (0.5 mLs Intramuscular Given 6/16/24 2309)   iohexoL (OMNIPAQUE 350) injection 100 mL (100 mLs Intravenous Given 6/17/24 0136)     Medical Decision Making  Amount and/or Complexity of Data Reviewed  Labs: ordered.  Radiology: ordered.    Risk  OTC drugs.  Prescription drug management.                58-year-old female with diabetic neuropathy, poorly controlled diabetes presents after an nail through shoe injury to the bottom of her foot, and obvious cellulitis.  Early systemic symptomatology/fever, but otherwise hemodynamically stable well.  Sepsis was considered and empiric bundle therapy for sepsis was initiated including broad-spectrum antibiotics, appropriate for coverage of both MRSA and Pseudomonas given that it went through the bottom of  the shoe.  Will evaluate for acute pathology requiring intervention with appropriate studies, treat symptomatically, and reassess.    Studies reviewed.  At time of reassessment there has been a slight increase in the overall size of the swelling, but no other significant changes.  I personally reviewed the x-rays, there was a focal area on it that I was very concerned was gas/evidence of gas-forming organism, which has to be considered in this kind of case, especially given the specific and loculation.  However the CT was done in follow-up to this, and was thankfully negative.  The patient did not ultimately meet criteria for sepsis, and as they are able to take oral antibiotics we will be able to try going home.  They have a good relationship with her podiatrist here in the [facility] system, and we will be calling for follow-up today for wound check.  Counseled on strict return precautions and self wound checks twice daily.                      Clinical Impression:  Final diagnoses:  [M79.5] Foreign body (FB) in soft tissue  [M79.5] Foreign body (FB) in soft tissue - nail in foot, r/o retained fb  [E11.628, L03.119] Cellulitis in diabetic foot (Primary)          ED Disposition Condition    Discharge Stable          ED Prescriptions       Medication Sig Dispense Start Date End Date Auth. Provider    levoFLOXacin (LEVAQUIN) 750 MG tablet  (Status: Discontinued) Take 1 tablet (750 mg total) by mouth once daily. 7 tablet 6/17/2024 6/17/2024 Arjun Boo MD    sulfamethoxazole-trimethoprim 800-160mg (BACTRIM DS) 800-160 mg Tab  (Status: Discontinued) Take 1 tablet by mouth 2 (two) times daily. for 7 days 14 tablet 6/17/2024 6/17/2024 Arjun Boo MD    sulfamethoxazole-trimethoprim 800-160mg (BACTRIM DS) 800-160 mg Tab Take 1 tablet by mouth 2 (two) times daily. for 7 days 14 tablet 6/17/2024 6/24/2024 Arjun Boo MD    levoFLOXacin (LEVAQUIN) 750 MG tablet Take 1 tablet (750 mg total) by mouth once daily. 7 tablet  6/17/2024 -- Arjun Boo MD          Follow-up Information    None          Arjun Boo MD  06/17/24 0661

## 2024-06-17 NOTE — ED NOTES
Pt arrived at the ED with complaints of stepping on a nail on her R foot. She states she stepped on the nail yesterday, denies any current pain. Pt does not know when she got her last tetanus shot and has hx of DM2. Redness noted on R foot. Code sepsis called overhead and bloodwork and urine sent to lab for specimen collection. Lab at the bedside collecting blood cultures.

## 2024-06-17 NOTE — ED NOTES
Pt given a cup of ice water, tolerated well. No further needs at this time.   Patient/Caregiver provided printed discharge information.

## 2024-06-19 ENCOUNTER — OFFICE VISIT (OUTPATIENT)
Dept: PODIATRY | Facility: CLINIC | Age: 59
End: 2024-06-19
Payer: COMMERCIAL

## 2024-06-19 ENCOUNTER — LAB VISIT (OUTPATIENT)
Dept: LAB | Facility: HOSPITAL | Age: 59
End: 2024-06-19
Attending: PODIATRIST
Payer: COMMERCIAL

## 2024-06-19 ENCOUNTER — PATIENT MESSAGE (OUTPATIENT)
Dept: PODIATRY | Facility: CLINIC | Age: 59
End: 2024-06-19

## 2024-06-19 VITALS
SYSTOLIC BLOOD PRESSURE: 130 MMHG | DIASTOLIC BLOOD PRESSURE: 81 MMHG | BODY MASS INDEX: 29.38 KG/M2 | TEMPERATURE: 99 F | HEIGHT: 66 IN | HEART RATE: 102 BPM

## 2024-06-19 DIAGNOSIS — L97.518 ULCER OF RIGHT FOOT WITH OTHER SEVERITY: ICD-10-CM

## 2024-06-19 DIAGNOSIS — E11.49 TYPE II DIABETES MELLITUS WITH NEUROLOGICAL MANIFESTATIONS: ICD-10-CM

## 2024-06-19 DIAGNOSIS — L97.512 FOOT ULCER, RIGHT, WITH FAT LAYER EXPOSED: ICD-10-CM

## 2024-06-19 DIAGNOSIS — L97.518 ULCER OF RIGHT FOOT WITH OTHER SEVERITY: Primary | ICD-10-CM

## 2024-06-19 DIAGNOSIS — S99.921D FOOT INJURY, RIGHT, SUBSEQUENT ENCOUNTER: ICD-10-CM

## 2024-06-19 LAB
BASOPHILS # BLD AUTO: 0.04 K/UL (ref 0–0.2)
BASOPHILS NFR BLD: 0.3 % (ref 0–1.9)
CRP SERPL-MCNC: 96.9 MG/L (ref 0–8.2)
DIFFERENTIAL METHOD BLD: ABNORMAL
EOSINOPHIL # BLD AUTO: 0.2 K/UL (ref 0–0.5)
EOSINOPHIL NFR BLD: 1.5 % (ref 0–8)
ERYTHROCYTE [DISTWIDTH] IN BLOOD BY AUTOMATED COUNT: 13.2 % (ref 11.5–14.5)
ERYTHROCYTE [SEDIMENTATION RATE] IN BLOOD BY PHOTOMETRIC METHOD: 39 MM/HR (ref 0–36)
HCT VFR BLD AUTO: 36.7 % (ref 37–48.5)
HGB BLD-MCNC: 11.9 G/DL (ref 12–16)
IMM GRANULOCYTES # BLD AUTO: 0.05 K/UL (ref 0–0.04)
IMM GRANULOCYTES NFR BLD AUTO: 0.4 % (ref 0–0.5)
LYMPHOCYTES # BLD AUTO: 2.8 K/UL (ref 1–4.8)
LYMPHOCYTES NFR BLD: 22.4 % (ref 18–48)
MCH RBC QN AUTO: 30.2 PG (ref 27–31)
MCHC RBC AUTO-ENTMCNC: 32.4 G/DL (ref 32–36)
MCV RBC AUTO: 93 FL (ref 82–98)
MONOCYTES # BLD AUTO: 0.9 K/UL (ref 0.3–1)
MONOCYTES NFR BLD: 7 % (ref 4–15)
NEUTROPHILS # BLD AUTO: 8.7 K/UL (ref 1.8–7.7)
NEUTROPHILS NFR BLD: 68.4 % (ref 38–73)
NRBC BLD-RTO: 0 /100 WBC
PLATELET # BLD AUTO: 380 K/UL (ref 150–450)
PMV BLD AUTO: 10.6 FL (ref 9.2–12.9)
RBC # BLD AUTO: 3.94 M/UL (ref 4–5.4)
WBC # BLD AUTO: 12.67 K/UL (ref 3.9–12.7)

## 2024-06-19 PROCEDURE — 86140 C-REACTIVE PROTEIN: CPT | Performed by: PODIATRIST

## 2024-06-19 PROCEDURE — 1160F RVW MEDS BY RX/DR IN RCRD: CPT | Mod: CPTII,S$GLB,, | Performed by: PODIATRIST

## 2024-06-19 PROCEDURE — 3075F SYST BP GE 130 - 139MM HG: CPT | Mod: CPTII,S$GLB,, | Performed by: PODIATRIST

## 2024-06-19 PROCEDURE — 85652 RBC SED RATE AUTOMATED: CPT | Performed by: PODIATRIST

## 2024-06-19 PROCEDURE — 36415 COLL VENOUS BLD VENIPUNCTURE: CPT | Mod: PN | Performed by: PODIATRIST

## 2024-06-19 PROCEDURE — 3079F DIAST BP 80-89 MM HG: CPT | Mod: CPTII,S$GLB,, | Performed by: PODIATRIST

## 2024-06-19 PROCEDURE — 99214 OFFICE O/P EST MOD 30 MIN: CPT | Mod: 25,S$GLB,, | Performed by: PODIATRIST

## 2024-06-19 PROCEDURE — 11042 DBRDMT SUBQ TIS 1ST 20SQCM/<: CPT | Mod: S$GLB,,, | Performed by: PODIATRIST

## 2024-06-19 PROCEDURE — 4010F ACE/ARB THERAPY RXD/TAKEN: CPT | Mod: CPTII,S$GLB,, | Performed by: PODIATRIST

## 2024-06-19 PROCEDURE — 85025 COMPLETE CBC W/AUTO DIFF WBC: CPT | Performed by: PODIATRIST

## 2024-06-19 PROCEDURE — 3008F BODY MASS INDEX DOCD: CPT | Mod: CPTII,S$GLB,, | Performed by: PODIATRIST

## 2024-06-19 PROCEDURE — 99999 PR PBB SHADOW E&M-EST. PATIENT-LVL IV: CPT | Mod: PBBFAC,,, | Performed by: PODIATRIST

## 2024-06-19 PROCEDURE — 1159F MED LIST DOCD IN RCRD: CPT | Mod: CPTII,S$GLB,, | Performed by: PODIATRIST

## 2024-06-19 RX ORDER — METOPROLOL SUCCINATE 50 MG/1
50 TABLET, EXTENDED RELEASE ORAL
COMMUNITY

## 2024-06-19 RX ORDER — BUPROPION HYDROCHLORIDE 150 MG/1
150 TABLET, EXTENDED RELEASE ORAL DAILY PRN
COMMUNITY

## 2024-06-19 RX ORDER — SITAGLIPTIN AND METFORMIN HYDROCHLORIDE 1000; 100 MG/1; MG/1
1 TABLET, FILM COATED, EXTENDED RELEASE ORAL
COMMUNITY
Start: 2024-05-20

## 2024-06-19 RX ORDER — INSULIN DETEMIR 100 [IU]/ML
INJECTION, SOLUTION SUBCUTANEOUS
COMMUNITY
Start: 2024-01-24

## 2024-06-19 RX ORDER — GLIPIZIDE 5 MG/1
5 TABLET, FILM COATED, EXTENDED RELEASE ORAL EVERY MORNING
COMMUNITY

## 2024-06-19 NOTE — PROGRESS NOTES
Subjective:      Patient ID: Jamila Xiong is a 58 y.o. female.    Chief Complaint:   Wound Care (Right, redness still present, swelling still present, no drainage)    Jamila is a 58 y.o. female who presents to the clinic for evaluation and treatment of high risk feet. Jamila has a past medical history of Abnormal Pap smear, appendectomy (2000), and Hypertension. The patient's chief complaint is diabetic foot ulcer,     Injury occurred sat pm. Went to ed Sunday bc fever /chills.  No vomiting  Swelling started Sunday; redness worse  Relates history her glucose was 230.  She does have difficulty controlling her sugar  Denies fever chills nausea vomiting currently  Has not been applying any topical relates was not directed to   no drainage no bleeding    Taking her oral antibiotics with no issues  Patient of Dr. Leigh.  Patient relates this was 1st available podiatry appointment     Encounter Date: 6/16/2024        History           Chief Complaint   Patient presents with    Foot Injury       Patient presents to ED with c/o noticing she had unknowingly stepped on a arsh nail last night around 1800. Reports hx of DMII. Unknown date of last tetanus shot. Site appears small, round, and dark colored with yellow tissue surrounding wound bed. Patient also reports having fever of 100.4 tonight + fever and chills. States taking tylenol 1 hour PTA. Temp 99.7 in triage, VSS. Patient ambulatory.       HPI  58-year-old female with history of diabetes and diabetic neuropathy who largely can not feel the bottom of her feet presents brought in by self through triage after getting a nail through her sandal yesterday.  States that she was walking when a nail went in to her foot through her sandal, she does not exactly when it happened although when she came back inside she heard tapping on the ground with walking, this is how she knew it was there, and took effort to pry the shoe offer foot.  That over the course of the day since, there  "has been progressively worsening redness in the area, and that she had a fever T-max 100.4° at home.  Denies cough, nasal congestion, myalgias, or any other systemic or infectious symptoms or other acute complaints  Review of patient's allergies indicates:    Plan        58-year-old female with diabetic neuropathy, poorly controlled diabetes presents after an nail through shoe injury to the bottom of her foot, and obvious cellulitis.  Early systemic symptomatology/fever, but otherwise hemodynamically stable well.  Sepsis was considered and empiric bundle therapy for sepsis was initiated including broad-spectrum antibiotics, appropriate for coverage of both MRSA and Pseudomonas given that it went through the bottom of the shoe.  Will evaluate for acute pathology requiring intervention with appropriate studies, treat symptomatically, and reassess.     Studies reviewed.  At time of reassessment there has been a slight increase in the overall size of the swelling, but no other significant changes.  I personally reviewed the x-rays, there was a focal area on it that I was very concerned was gas/evidence of gas-forming organism, which has to be considered in this kind of case, especially given the specific and loculation.  However the CT was done in follow-up to this, and was thankfully negative.  The patient did not ultimately meet criteria for sepsis, and as they are able to take oral antibiotics we will be able to try going home.  They have a good relationship with her podiatrist here in the [facility] system, and we will be calling for follow-up today for wound check.  Counseled on strict return precautions and self wound checks twice daily.             Component 3 d ago   Blood Culture, Routine No Growth to date P   Blood Culture, Routine No Growth to date P   Resulting Agency [facility]        PCP: Tushar Pino MD  [facility] = no notes available  Date Last Seen by PCP: 5/2024         No results found for: "HGBA1C" 9.5 april   "   Past Medical History:   Diagnosis Date    Abnormal Pap smear     Hx of appendectomy     Hypertension      Past Surgical History:   Procedure Laterality Date     SECTION      DILATION AND CURETTAGE OF UTERUS      TUBAL LIGATION       Current Outpatient Medications on File Prior to Visit   Medication Sig Dispense Refill    allopurinoL (ZYLOPRIM) 300 MG tablet Take 300 mg by mouth.      buPROPion (WELLBUTRIN SR) 150 MG TBSR 12 hr tablet Take 150 mg by mouth daily as needed.      FREESTYLE LITE STRIPS Strp TEST ONCE D      gabapentin (NEURONTIN) 800 MG tablet Take 800 mg by mouth 3 (three) times daily.      glipiZIDE 5 MG TR24 Take 5 mg by mouth every morning.      JANUMET -1,000 mg TM24 Take 1 tablet by mouth.      LEVEMIR FLEXPEN 100 unit/mL (3 mL) InPn pen INJECT 20 UNITS INTO THE SKIN EVERY DAY      levoFLOXacin (LEVAQUIN) 750 MG tablet Take 1 tablet (750 mg total) by mouth once daily. 7 tablet 0    lisinopriL (PRINIVIL,ZESTRIL) 40 MG tablet TAKE 1 TABLET PO DAILY      metoprolol tartrate (LOPRESSOR) 25 MG tablet Take 25 mg by mouth 2 (two) times daily.      pravastatin (PRAVACHOL) 80 MG tablet TK 1 T PO QD  4    sulfamethoxazole-trimethoprim 800-160mg (BACTRIM DS) 800-160 mg Tab Take 1 tablet by mouth 2 (two) times daily. for 7 days 14 tablet 0    terbinafine HCL (LAMISIL) 1 % cream Apply topically Daily. To affected toenails. 15 g 3    econazole nitrate 1 % cream Apply topically 2 (two) times daily. 85 g 1    metoprolol succinate (TOPROL-XL) 50 MG 24 hr tablet Take 50 mg by mouth. (Patient not taking: Reported on 2024)      [DISCONTINUED] amitriptyline (ELAVIL) 25 MG tablet TK 1 T PO QD HS      [DISCONTINUED] clotrimazole-betamethasone 1-0.05% (LOTRISONE) cream Apply topically once daily. To left foot. 45 g 1    [DISCONTINUED] efinaconazole (JUBLIA) 10 % Jeffrey Apply 1 Application topically once daily. To toenails 8 mL 6    [DISCONTINUED] ezetimibe (ZETIA) 10 mg tablet Take 10 mg by mouth.    "   [DISCONTINUED] insulin glargine (LANTUS) 100 unit/mL injection Inject 15 Units into the skin once daily.      [DISCONTINUED] triamterene-hydrochlorothiazide 75-50 mg (MAXZIDE) 75-50 mg per tablet        No current facility-administered medications on file prior to visit.     Review of patient's allergies indicates:  No Known Allergies    Review of Systems   Constitutional: Negative for chills, decreased appetite, fever, malaise/fatigue, night sweats, weight gain and weight loss.   Cardiovascular:  Positive for leg swelling. Negative for chest pain, claudication, dyspnea on exertion, palpitations and syncope.   Respiratory:  Negative for cough and shortness of breath.    Endocrine: Negative for cold intolerance and heat intolerance.   Hematologic/Lymphatic: Negative for bleeding problem. Does not bruise/bleed easily.   Skin:  Positive for color change. Negative for dry skin, flushing, itching, nail changes, poor wound healing, rash, skin cancer, suspicious lesions and unusual hair distribution.   Musculoskeletal:  Positive for joint swelling. Negative for arthritis, back pain, falls, gout, joint pain, muscle cramps, muscle weakness, myalgias, neck pain and stiffness.   Gastrointestinal:  Negative for diarrhea, nausea and vomiting.   Neurological:  Positive for numbness and paresthesias. Negative for dizziness, focal weakness, light-headedness, tremors, vertigo and weakness.   Psychiatric/Behavioral:  Negative for altered mental status and depression. The patient does not have insomnia.    Allergic/Immunologic: Negative.            Objective:       Vitals:    06/19/24 1015   BP: 130/81   Pulse: 102   Temp: 98.7 °F (37.1 °C)   Height: 5' 6" (1.676 m)   PainSc:   3   PainLoc: Foot         Physical Exam  Vitals reviewed.   Constitutional:       General: She is not in acute distress.     Appearance: She is well-developed. She is not ill-appearing, toxic-appearing or diaphoretic.      Comments:        Cardiovascular:    "   Pulses:           Dorsalis pedis pulses are 2+ on the right side and 2+ on the left side.        Posterior tibial pulses are 1+ on the right side and 1+ on the left side.      Comments: Pitting edema   Musculoskeletal:         General: No tenderness.      Right lower le+ Edema present.      Left lower leg: No edema.      Right ankle: Normal.      Right Achilles Tendon: Normal.      Left ankle: Normal.      Left Achilles Tendon: Normal.      Right foot: No tenderness or bony tenderness.      Left foot: No tenderness or bony tenderness.      Comments: No pain on palpation   Feet:      Right foot:      Protective Sensation: 10 sites tested.  0 sites sensed.      Skin integrity: Skin breakdown, callus and dry skin present. No ulcer, blister, erythema or warmth.      Left foot:      Protective Sensation: 10 sites tested.  0 sites sensed.      Skin integrity: No ulcer, blister, skin breakdown, erythema, warmth, callus or dry skin.      Comments: Sub 5th metatarsophalangeal joint callus no signs of infection    Sub 4th metatarsophalangeal joint ulcers superficial puncture wound  Ulcer location:  right, plantar foot   Pre debridement Measurements: 0.2cm x 0.2cm x unknown cm   Post debridement Measurements : 0.3 x 0.2cm x 0.1 cm   Signs of infection: Yes  Erythema: Yes blanchable  Undermining: No  Tunneling: No  Drainage: None  Periwound skin: hyperkeratotic  Wound Base: granular     Skin:     General: Skin is warm.      Capillary Refill: Capillary refill takes 2 to 3 seconds.      Coloration: Skin is not pale.      Findings: No erythema or rash.   Neurological:      Mental Status: She is alert and oriented to person, place, and time.      Sensory: No sensory deficit.      Gait: Gait is intact.   Psychiatric:         Attention and Perception: Attention normal.         Mood and Affect: Mood normal.         Speech: Speech normal.         Behavior: Behavior normal.         Thought Content: Thought content normal.          Cognition and Memory: Cognition normal.         Judgment: Judgment normal.         Xray:  TECHNIQUE:  AP, lateral, and oblique views of the right foot were performed.     COMPARISON:  None     FINDINGS:  No acute fracture or dislocation. Alignment is normal. The Lisfranc articulation is congruent. There is joint space narrowing of the great toe metatarsophalangeal joint with marginal osteophytes.  There are vascular calcifications.  There is no radiopaque foreign body in the field.     Impression:     No radiopaque foreign body.        Electronically signed by:Moses Garrett  Date:                                            06/16/2024  Time:                                           22:47    CT Foot With Contrast Right  Order: [id number]  Status: Final result       Visible to patient: Yes (seen)       Next appt: 06/28/2024 at 08:30 AM in Podiatry (Elaine Leigh DPM)    0 Result Notes  Details    Reading Physician Reading Date Result Priority   Moses Garrett,   [phone] 6/17/2024 STAT     Narrative & Impression  EXAMINATION:  CT FOOT WITH CONTRAST RIGHT     CLINICAL HISTORY:  cellulitis, r/o gasforming infection;     TECHNIQUE:  Axial CT images of the right foot with sagittal and coronal reformats after the intravenous administration of 100 mL Omnipaque 350.     COMPARISON:  Radiographs from 06/16/2024.     FINDINGS:  Bones are intact.  There is no acute fracture or dislocation.  Alignment is normal.  There is no CT evidence of acute osteomyelitis.  There is joint space narrowing of the great toe metatarsophalangeal joint, mild, with marginal osteophytes.  There is a small cutaneous soft tissue defect of the lateral aspect of the foot, overlying the 5th metacarpophalangeal joint, with mild underlying soft tissue edema.  There is no evidence of soft tissue gas or evidence of a rim enhancing fluid collection or abscess.  There is no evidence of a radiopaque foreign body.  There is mild dorsal soft tissue edema  of the forefoot.  There is moderate fatty atrophy of the visualized musculature.  There are vascular calcifications.     Impression:     Small soft tissue defect/laceration overlying the 5th metatarsophalangeal joint with mild underlying soft tissue edema, correlate for cellulitis.  No abscess, soft tissue gas, or CT evidence of acute osteomyelitis.        Electronically signed by:Moses Garrett  Date:                                            06/17/2024  Time:                                           01:57                  Narrative & Impression  EXAMINATION:  US LOWER EXTREMITY ARTERIES BILATERAL     CLINICAL HISTORY:  Unspecified atherosclerosis of native arteries of extremities, bilateral legs     TECHNIQUE:  Bilateral lower extremity arterial duplex ultrasound examination performed. Multiple gray scale and color doppler images were obtained in addition to waveform analysis.  Ankle-brachial indices were calculated.     COMPARISON:  Bilateral lower extremity arterial ultrasound dated 12/17/2014     FINDINGS:  The peak systolic velocities on the right are as follows, in centimeters/second:     Common femoral artery: 167, triphasic     Deep profundus: 52, biphasic     Superficial femoral artery, proximal: 142, triphasic     Superficial femoral artery, mid portion: 145, triphasic     Superficial femoral artery, distal: 101, triphasic     Popliteal artery: 86, triphasic     Distal popliteal artery: 100, biphasic     Anterior tibial artery: 60, biphasic     Posterior tibial artery: 64, biphasic     Other: Mildly complex cystic structure at the left popliteal region extending to the proximal medial calf suggesting Baker's cyst measuring 7.9 x 1.7 x 4.1 cm.     The peak systolic velocities on the left are as follows, in centimeters/second:     Common femoral artery: 125, triphasic     Deep profundus: 50, biphasic     Superficial femoral artery, proximal: 113, triphasic     Superficial femoral artery, mid portion: 100,  triphasic     Superficial femoral artery, distal: 149, triphasic     Popliteal artery: 85, biphasic     Distal popliteal artery: 71, triphasic     Anterior tibial artery: 53, biphasic     Posterior tibial artery: 49, triphasic     Other: None     Impression:     Velocity parameters as above.  No abnormal velocity doubling to suggest hemodynamically significant stenosis.     Mildly complex cystic structure at the left popliteal region extending to the proximal medial calf suggesting Baker's cyst.        Electronically signed by:Nabor Urias  Date:                                            08/04/2023  Time:                                           12:11                        Assessment:       Encounter Diagnoses   Name Primary?    Ulcer of right foot with other severity Yes    Type II diabetes mellitus with neurological manifestations     Foot ulcer, right, with fat layer exposed     Foot injury, right, subsequent encounter          Plan:       Jamila was seen today for wound care.    Diagnoses and all orders for this visit:    Ulcer of right foot with other severity  -     CBC auto differential; Future  -     Sedimentation rate; Future  -     C-reactive protein; Future    Type II diabetes mellitus with neurological manifestations    Foot ulcer, right, with fat layer exposed    Foot injury, right, subsequent encounter      I counseled the patient on her conditions, their implications and medical management.    - pcp notes/labs not available for [place] [facility] [place]     -pt taking abx.     - ed visit /labs/imaging reviewed    - blood culture = NGTD    - previous vascular studies normal. Consider repeat    - concerned about high HbA1C  Recommend checking glucose daily    - rx cbc/sed/esr. Wbc should trend down now on day 3 of abx.   - nothing to wound culture today    - no driving /elevate    - work note for 1 week until re-evaluated     - Debridement: With verbal consent, nonviable tissues on the right foot were debrided  to subq utilizing a  sterile No. 3 scalpel and forceps. Minimal bleeding controlled with direct pressure  The patient tolerated this well.     Dressings: Wound gel  Surgical shoe  Tubigrip    Follow-up:Patient is to return to the clinic in 1 week  for follow-up but should call [facility] immediately if any signs of infection, such as fever, chills, sweats, increased redness or pain.    Short-term goals include maintaining good offloading and minimizing bioburden, promoting granulation and epithelialization to healing.  Long-term goals include keeping the wound healed by good offloading and medical management under the direction of internist.      Consider MRI. Ct and xray negative    1 week    Strict ED protocols       No follow-ups on file.

## 2024-06-19 NOTE — LETTER
June 19, 2024      [address] - Podiatry [address]nd Fl  [address] [place] [address]  [address]  [place] [address]  Phone: [phone]  Fax: [phone]       Patient: Jamila Xiong   YOB: 1965  Date of Visit: 06/19/2024    To Whom It May Concern:    Amira Xiong  was at [facility] on 06/19/2024. She may return to work on 6/26/24 with no restrictions. Please allow patient to be off from work until her next evaluation on 6/26/2024. If you have any questions or concerns, or if I can be of further assistance, please do not hesitate to contact me.    Sincerely,    Karen Perez DPM

## 2024-06-20 ENCOUNTER — TELEPHONE (OUTPATIENT)
Dept: PODIATRY | Facility: CLINIC | Age: 59
End: 2024-06-20
Payer: COMMERCIAL

## 2024-06-20 DIAGNOSIS — L97.512 FOOT ULCER, RIGHT, WITH FAT LAYER EXPOSED: Primary | ICD-10-CM

## 2024-06-20 LAB — BACTERIA UR CULT: ABNORMAL

## 2024-06-20 NOTE — TELEPHONE ENCOUNTER
Called pt.   Fever has resolved.. took 2 tyenol last night.   Today 97.0   Feel well. No n/v/f/c.  Glucose 99.  Taking abx  Swelling same.  No pain in foot.   Pt relates ED called to f/u.     Still ngtd on blood cx    Continue to monitor temp and glucose    Reviewed labs below   Pt can message tomorrow with update          Component Ref Range & Units 1 d ago   CRP 0.0 - 8.2 mg/L 96.9 High    Resulting Agency  [facility]                   0 Result Notes       Component Ref Range & Units 1 d ago   Sed Rate 0 - 36 mm/Hr 39 High    Resulting Agency  [facility]              Specimen Collected: 06/19/24 12:01 CDT Last Resulted: 06/19/24 19:02 CDT        Lab Flowsheet        Order Details        View Encounter        Lab and Collection Details        Routing        Result History     View All Conversations on this Encounter           Result Care Coordination      Patient Communication     Add Comments   Seen Back to Top            Contains abnormal data CBC auto differential  Order: [id number]  Status: Final result       Visible to patient: Yes (seen)       Dx: Ulcer of right foot with other severity    0 Result Notes        Component Ref Range & Units 1 d ago 4 d ago   WBC 3.90 - 12.70 K/uL 12.67 16.18 High    RBC 4.00 - 5.40 M/uL 3.94 Low  4.10   Hemoglobin 12.0 - 16.0 g/dL 11.9 Low  12.2   Hematocrit 37.0 - 48.5 % 36.7 Low  36.9 Low    MCV 82 - 98 fL 93 90   MCH 27.0 - 31.0 pg 30.2 29.8   MCHC 32.0 - 36.0 g/dL 32.4 33.1   RDW 11.5 - 14.5 % 13.2 13.1   Platelets 150 - 450 K/uL 380 328   MPV 9.2 - 12.9 fL 10.6 10.7   Immature Granulocytes 0.0 - 0.5 % 0.4 0.4   Gran # (ANC) 1.8 - 7.7 K/uL 8.7 High  11.8 High    Immature Grans (Abs) 0.00 - 0.04 K/uL 0.05 High  0.07 High  CM   Comment: Mild elevation in immature granulocytes is non specific and  can be seen in a variety of conditions including stress response,  acute inflammation, trauma and pregnancy. Correlation with other  laboratory and clinical findings is essential.   Lymph # 1.0  - 4.8 K/uL 2.8 2.8   Mono # 0.3 - 1.0 K/uL 0.9 1.4 High    Eos # 0.0 - 0.5 K/uL 0.2 0.1   Baso # 0.00 - 0.20 K/uL 0.04 0.04   nRBC 0 /100 WBC 0 0   Gran % 38.0 - 73.0 % 68.4 72.8   Lymph % 18.0 - 48.0 % 22.4 17.2 Low    Mono % 4.0 - 15.0 % 7.0 8.8   Eosinophil % 0.0 - 8.0 % 1.5 0.6   Basophil % 0.0 - 1.9 % 0.3 0.2   Differential Method  Automated Automated   Resulting Agency  [facility]              Specimen Collected: 06/19/24 12:01 CDT Last Resulted: 06/19/24 19:02 CDT             Hi Dr. Trevino. I started running a fever, 99.4, and I have a headache.  I'll will keep an eye on it.   Not sure what all the test results mean.     Thanks.  Jamila Xiong

## 2024-06-21 ENCOUNTER — PATIENT MESSAGE (OUTPATIENT)
Dept: PODIATRY | Facility: CLINIC | Age: 59
End: 2024-06-21
Payer: COMMERCIAL

## 2024-06-22 LAB
BACTERIA BLD CULT: NORMAL
BACTERIA BLD CULT: NORMAL

## 2024-06-25 ENCOUNTER — TELEPHONE (OUTPATIENT)
Dept: PODIATRY | Facility: CLINIC | Age: 59
End: 2024-06-25
Payer: COMMERCIAL

## 2024-06-26 ENCOUNTER — OFFICE VISIT (OUTPATIENT)
Dept: PODIATRY | Facility: CLINIC | Age: 59
End: 2024-06-26
Payer: COMMERCIAL

## 2024-06-26 VITALS
BODY MASS INDEX: 29.38 KG/M2 | TEMPERATURE: 98 F | DIASTOLIC BLOOD PRESSURE: 66 MMHG | HEART RATE: 76 BPM | HEIGHT: 66 IN | SYSTOLIC BLOOD PRESSURE: 101 MMHG

## 2024-06-26 DIAGNOSIS — E11.49 TYPE II DIABETES MELLITUS WITH NEUROLOGICAL MANIFESTATIONS: ICD-10-CM

## 2024-06-26 DIAGNOSIS — L97.512 FOOT ULCER, RIGHT, WITH FAT LAYER EXPOSED: Primary | ICD-10-CM

## 2024-06-26 DIAGNOSIS — S99.921D FOOT INJURY, RIGHT, SUBSEQUENT ENCOUNTER: ICD-10-CM

## 2024-06-26 PROCEDURE — 3008F BODY MASS INDEX DOCD: CPT | Mod: CPTII,S$GLB,, | Performed by: PODIATRIST

## 2024-06-26 PROCEDURE — 3074F SYST BP LT 130 MM HG: CPT | Mod: CPTII,S$GLB,, | Performed by: PODIATRIST

## 2024-06-26 PROCEDURE — 99214 OFFICE O/P EST MOD 30 MIN: CPT | Mod: S$GLB,,, | Performed by: PODIATRIST

## 2024-06-26 PROCEDURE — 3078F DIAST BP <80 MM HG: CPT | Mod: CPTII,S$GLB,, | Performed by: PODIATRIST

## 2024-06-26 PROCEDURE — 1159F MED LIST DOCD IN RCRD: CPT | Mod: CPTII,S$GLB,, | Performed by: PODIATRIST

## 2024-06-26 PROCEDURE — 1160F RVW MEDS BY RX/DR IN RCRD: CPT | Mod: CPTII,S$GLB,, | Performed by: PODIATRIST

## 2024-06-26 PROCEDURE — 99999 PR PBB SHADOW E&M-EST. PATIENT-LVL V: CPT | Mod: PBBFAC,,, | Performed by: PODIATRIST

## 2024-06-26 PROCEDURE — 4010F ACE/ARB THERAPY RXD/TAKEN: CPT | Mod: CPTII,S$GLB,, | Performed by: PODIATRIST

## 2024-06-26 RX ORDER — PEN NEEDLE, DIABETIC 32GX 5/32"
NEEDLE, DISPOSABLE MISCELLANEOUS
COMMUNITY
Start: 2024-06-19

## 2024-06-26 NOTE — PROGRESS NOTES
"Subjective:      Patient ID: Jamila Xiong is a 58 y.o. female.    Chief Complaint:   Wound Check (Right foot) and Diabetes Mellitus    Jamila is a 58 y.o. female who presents to the clinic for evaluation and treatment of high risk feet. Jamila has a past medical history of Abnormal Pap smear, appendectomy (), and Hypertension. The patient's chief complaint is f/u diabetic foot ulcer,      Doing better, redness and swelling going down.   Still not driving/out of work  Wearing surgical shoe  No drainage  Finished abx  No f/c/n/v. Sugars still below 300    Few questions : returning to work and driving, going out of town  , bathing,       PCP: Tushar Pino MD  [facility] [place] = no notes available  Date Last Seen by PCP: 2024        No results found for: "HGBA1C" 9.     Past Medical History:   Diagnosis Date    Abnormal Pap smear     Hx of appendectomy     Hypertension      Past Surgical History:   Procedure Laterality Date     SECTION      DILATION AND CURETTAGE OF UTERUS      TUBAL LIGATION       Current Outpatient Medications on File Prior to Visit   Medication Sig Dispense Refill    allopurinoL (ZYLOPRIM) 300 MG tablet Take 300 mg by mouth.      BD MARIS 2ND GEN PEN NEEDLE 32 gauge x 5/32" Ndle SMARTSIG:Injection Daily      buPROPion (WELLBUTRIN SR) 150 MG TBSR 12 hr tablet Take 150 mg by mouth daily as needed.      FREESTYLE LITE STRIPS Strp TEST ONCE D      gabapentin (NEURONTIN) 800 MG tablet Take 800 mg by mouth 3 (three) times daily.      glipiZIDE 5 MG TR24 Take 5 mg by mouth every morning.      JANUMET -1,000 mg TM24 Take 1 tablet by mouth.      LEVEMIR FLEXPEN 100 unit/mL (3 mL) InPn pen INJECT 20 UNITS INTO THE SKIN EVERY DAY      levoFLOXacin (LEVAQUIN) 750 MG tablet Take 1 tablet (750 mg total) by mouth once daily. 7 tablet 0    lisinopriL (PRINIVIL,ZESTRIL) 40 MG tablet TAKE 1 TABLET PO DAILY      metoprolol succinate (TOPROL-XL) 50 MG 24 hr tablet Take 50 mg by mouth.      " "metoprolol tartrate (LOPRESSOR) 25 MG tablet Take 25 mg by mouth 2 (two) times daily.      pravastatin (PRAVACHOL) 80 MG tablet TK 1 T PO QD  4    terbinafine HCL (LAMISIL) 1 % cream Apply topically Daily. To affected toenails. 15 g 3    econazole nitrate 1 % cream Apply topically 2 (two) times daily. 85 g 1     No current facility-administered medications on file prior to visit.     Review of patient's allergies indicates:  No Known Allergies    Review of Systems   Constitutional: Negative for chills, decreased appetite, fever, malaise/fatigue, night sweats, weight gain and weight loss.   Cardiovascular:  Positive for leg swelling. Negative for chest pain, claudication, dyspnea on exertion, palpitations and syncope.   Respiratory:  Negative for cough and shortness of breath.    Endocrine: Negative for cold intolerance and heat intolerance.   Hematologic/Lymphatic: Negative for bleeding problem. Does not bruise/bleed easily.   Skin:  Positive for color change. Negative for dry skin, flushing, itching, nail changes, poor wound healing, rash, skin cancer, suspicious lesions and unusual hair distribution.   Musculoskeletal:  Positive for joint swelling. Negative for arthritis, back pain, falls, gout, joint pain, muscle cramps, muscle weakness, myalgias, neck pain and stiffness.   Gastrointestinal:  Negative for diarrhea, nausea and vomiting.   Neurological:  Positive for numbness and paresthesias. Negative for dizziness, focal weakness, light-headedness, tremors, vertigo and weakness.   Psychiatric/Behavioral:  Negative for altered mental status and depression. The patient does not have insomnia.    Allergic/Immunologic: Negative.            Objective:       Vitals:    06/26/24 0918   BP: 101/66   Pulse: 76   Temp: 98.4 °F (36.9 °C)   Height: 5' 6" (1.676 m)   PainSc: 0-No pain         Physical Exam  Vitals reviewed.   Constitutional:       General: She is not in acute distress.     Appearance: She is well-developed. " She is not ill-appearing, toxic-appearing or diaphoretic.      Comments:  Surgical shoe/tubigrip       Cardiovascular:      Pulses:           Dorsalis pedis pulses are 2+ on the right side and 2+ on the left side.        Posterior tibial pulses are 1+ on the right side and 1+ on the left side.      Comments: nonPitting edema   Musculoskeletal:         General: No tenderness.      Right lower leg: Edema (improved) present.      Left lower leg: No edema.      Right ankle: Normal.      Right Achilles Tendon: Normal.      Left ankle: Normal.      Left Achilles Tendon: Normal.      Right foot: No tenderness or bony tenderness.      Left foot: No tenderness or bony tenderness.      Comments: No pain on palpation   Feet:      Right foot:      Protective Sensation: 10 sites tested.  0 sites sensed.      Skin integrity: Skin breakdown, callus and dry skin present. No ulcer, blister, erythema or warmth.      Left foot:      Protective Sensation: 10 sites tested.  0 sites sensed.      Skin integrity: No ulcer, blister, skin breakdown, erythema, warmth, callus or dry skin.      Comments: Sub 5th metatarsophalangeal joint  no signs of infection  5th digit blanchable erythema  Sub 4th metatarsophalangeal joint ulcers superficial puncture wound  Ulcer location:  right, plantar foot    Measurements: 0.2cm x 0.2cm x 0.1cm      Signs of infection: no  Erythema: Yes blanchable    IS mild dark hpk, no fluctuance or crepitus   No excessive warmth      Skin:     General: Skin is warm and dry.      Capillary Refill: Capillary refill takes 2 to 3 seconds.      Coloration: Skin is not pale.      Findings: No erythema or rash.   Neurological:      Mental Status: She is alert and oriented to person, place, and time.      Sensory: Sensory deficit present.      Gait: Gait abnormal.   Psychiatric:         Attention and Perception: Attention normal.         Mood and Affect: Mood normal.         Speech: Speech normal.         Behavior: Behavior  normal.         Thought Content: Thought content normal.         Cognition and Memory: Cognition normal.         Judgment: Judgment normal.         Xray:  TECHNIQUE:  AP, lateral, and oblique views of the right foot were performed.     COMPARISON:  None     FINDINGS:  No acute fracture or dislocation. Alignment is normal. The Lisfranc articulation is congruent. There is joint space narrowing of the great toe metatarsophalangeal joint with marginal osteophytes.  There are vascular calcifications.  There is no radiopaque foreign body in the field.     Impression:     No radiopaque foreign body.        Electronically signed by:Moses Garrett  Date:                                            06/16/2024  Time:                                           22:47    CT Foot With Contrast Right  Order: [id number]  Status: Final result       Visible to patient: Yes (seen)       Next appt: 06/28/2024 at 08:30 AM in Podiatry (Elaine Leigh DPM)    0 Result Notes  Details    Reading Physician Reading Date Result Priority   Moses Garrett,   [phone] 6/17/2024 STAT     Narrative & Impression  EXAMINATION:  CT FOOT WITH CONTRAST RIGHT     CLINICAL HISTORY:  cellulitis, r/o gasforming infection;     TECHNIQUE:  Axial CT images of the right foot with sagittal and coronal reformats after the intravenous administration of 100 mL Omnipaque 350.     COMPARISON:  Radiographs from 06/16/2024.     FINDINGS:  Bones are intact.  There is no acute fracture or dislocation.  Alignment is normal.  There is no CT evidence of acute osteomyelitis.  There is joint space narrowing of the great toe metatarsophalangeal joint, mild, with marginal osteophytes.  There is a small cutaneous soft tissue defect of the lateral aspect of the foot, overlying the 5th metacarpophalangeal joint, with mild underlying soft tissue edema.  There is no evidence of soft tissue gas or evidence of a rim enhancing fluid collection or abscess.  There is no evidence of a  radiopaque foreign body.  There is mild dorsal soft tissue edema of the forefoot.  There is moderate fatty atrophy of the visualized musculature.  There are vascular calcifications.     Impression:     Small soft tissue defect/laceration overlying the 5th metatarsophalangeal joint with mild underlying soft tissue edema, correlate for cellulitis.  No abscess, soft tissue gas, or CT evidence of acute osteomyelitis.        Electronically signed by:Moses Garrett  Date:                                            06/17/2024  Time:                                           01:57                  Narrative & Impression  EXAMINATION:  US LOWER EXTREMITY ARTERIES BILATERAL     CLINICAL HISTORY:  Unspecified atherosclerosis of native arteries of extremities, bilateral legs     TECHNIQUE:  Bilateral lower extremity arterial duplex ultrasound examination performed. Multiple gray scale and color doppler images were obtained in addition to waveform analysis.  Ankle-brachial indices were calculated.     COMPARISON:  Bilateral lower extremity arterial ultrasound dated 12/17/2014     FINDINGS:  The peak systolic velocities on the right are as follows, in centimeters/second:     Common femoral artery: 167, triphasic     Deep profundus: 52, biphasic     Superficial femoral artery, proximal: 142, triphasic     Superficial femoral artery, mid portion: 145, triphasic     Superficial femoral artery, distal: 101, triphasic     Popliteal artery: 86, triphasic     Distal popliteal artery: 100, biphasic     Anterior tibial artery: 60, biphasic     Posterior tibial artery: 64, biphasic     Other: Mildly complex cystic structure at the left popliteal region extending to the proximal medial calf suggesting Baker's cyst measuring 7.9 x 1.7 x 4.1 cm.     The peak systolic velocities on the left are as follows, in centimeters/second:     Common femoral artery: 125, triphasic     Deep profundus: 50, biphasic     Superficial femoral artery, proximal:  113, triphasic     Superficial femoral artery, mid portion: 100, triphasic     Superficial femoral artery, distal: 149, triphasic     Popliteal artery: 85, biphasic     Distal popliteal artery: 71, triphasic     Anterior tibial artery: 53, biphasic     Posterior tibial artery: 49, triphasic     Other: None     Impression:     Velocity parameters as above.  No abnormal velocity doubling to suggest hemodynamically significant stenosis.     Mildly complex cystic structure at the left popliteal region extending to the proximal medial calf suggesting Baker's cyst.        Electronically signed by:Nabor Urias  Date:                                            08/04/2023  Time:                                           12:11      6/19/24 6/26/24                    Assessment:       Encounter Diagnoses   Name Primary?    Foot ulcer, right, with fat layer exposed Yes    Type II diabetes mellitus with neurological manifestations     Foot injury, right, subsequent encounter            Plan:       Jamila was seen today for wound check and diabetes mellitus.    Diagnoses and all orders for this visit:    Foot ulcer, right, with fat layer exposed  -     MRI Foot (Forefoot) Right Without Contrast; Future    Type II diabetes mellitus with neurological manifestations  -     MRI Foot (Forefoot) Right Without Contrast; Future    Foot injury, right, subsequent encounter  -     MRI Foot (Forefoot) Right Without Contrast; Future        I counseled the patient on her conditions, their implications and medical management.      - rx mri to r/o abscess and osteomyleitis    - previous vascular studies normal. Consider repeat     -  re-dscussed high HbA1c. Wound healing. Continue to montior glucose closely    - check temp daily . Afeb     Dressings: Iodosorb  Surgical shoe     Ok to put on tennis shoe to drive only  Avoid tub water /keep dry     Elevate     Follow-up:Patient is to return to the clinic in 1 week  for follow-up  but [facility] if any signs of infection, such as fever, chills, sweats, increased redness or pain.       1 week f./u with other provider as I am out of town.    Recommend pt f/u with me after MRI in 2-3 weeks; pt also lives in [place] but no available f/u there , previous pt of Dr. Leigh     Strict ED protocols

## 2024-07-01 ENCOUNTER — OFFICE VISIT (OUTPATIENT)
Dept: PODIATRY | Facility: CLINIC | Age: 59
End: 2024-07-01
Payer: COMMERCIAL

## 2024-07-01 VITALS
HEIGHT: 66 IN | HEART RATE: 73 BPM | SYSTOLIC BLOOD PRESSURE: 128 MMHG | BODY MASS INDEX: 29.38 KG/M2 | DIASTOLIC BLOOD PRESSURE: 82 MMHG

## 2024-07-01 DIAGNOSIS — S99.921D FOOT INJURY, RIGHT, SUBSEQUENT ENCOUNTER: Primary | ICD-10-CM

## 2024-07-01 PROCEDURE — 3079F DIAST BP 80-89 MM HG: CPT | Mod: CPTII,S$GLB,, | Performed by: PODIATRIST

## 2024-07-01 PROCEDURE — 3074F SYST BP LT 130 MM HG: CPT | Mod: CPTII,S$GLB,, | Performed by: PODIATRIST

## 2024-07-01 PROCEDURE — 99213 OFFICE O/P EST LOW 20 MIN: CPT | Mod: S$GLB,,, | Performed by: PODIATRIST

## 2024-07-01 PROCEDURE — 3008F BODY MASS INDEX DOCD: CPT | Mod: CPTII,S$GLB,, | Performed by: PODIATRIST

## 2024-07-01 PROCEDURE — 1159F MED LIST DOCD IN RCRD: CPT | Mod: CPTII,S$GLB,, | Performed by: PODIATRIST

## 2024-07-01 PROCEDURE — 99999 PR PBB SHADOW E&M-EST. PATIENT-LVL III: CPT | Mod: PBBFAC,,, | Performed by: PODIATRIST

## 2024-07-01 PROCEDURE — 4010F ACE/ARB THERAPY RXD/TAKEN: CPT | Mod: CPTII,S$GLB,, | Performed by: PODIATRIST

## 2024-07-01 NOTE — PROGRESS NOTES
"Subjective:      Patient ID: Jamila Xiong is a 58 y.o. female.    Chief Complaint:   Wound Care (Right, swelling and redness is going away)    Jamila is a 58 y.o. female who presents to the clinic for evaluation and treatment of high risk feet. Jamila has a past medical history of Abnormal Pap smear, appendectomy (), and Hypertension. The patient's chief complaint is f/u penetrating foot injury with a arsh nail.        Few questions : returning to work and driving, going out of town  , bathing,       PCP: Tushar Pino MD  [facility] [place] = no notes available  Date Last Seen by PCP: 2024        No results found for: "HGBA1C" 9.     Past Medical History:   Diagnosis Date    Abnormal Pap smear     Hx of appendectomy     Hypertension      Past Surgical History:   Procedure Laterality Date     SECTION      DILATION AND CURETTAGE OF UTERUS      TUBAL LIGATION       Current Outpatient Medications on File Prior to Visit   Medication Sig Dispense Refill    allopurinoL (ZYLOPRIM) 300 MG tablet Take 300 mg by mouth.      BD MARIS 2ND GEN PEN NEEDLE 32 gauge x 5/32" Ndle SMARTSIG:Injection Daily      buPROPion (WELLBUTRIN SR) 150 MG TBSR 12 hr tablet Take 150 mg by mouth daily as needed.      FREESTYLE LITE STRIPS Strp TEST ONCE D      gabapentin (NEURONTIN) 800 MG tablet Take 800 mg by mouth 3 (three) times daily.      glipiZIDE 5 MG TR24 Take 5 mg by mouth every morning.      JANUMET -1,000 mg TM24 Take 1 tablet by mouth.      LEVEMIR FLEXPEN 100 unit/mL (3 mL) InPn pen INJECT 20 UNITS INTO THE SKIN EVERY DAY      levoFLOXacin (LEVAQUIN) 750 MG tablet Take 1 tablet (750 mg total) by mouth once daily. 7 tablet 0    lisinopriL (PRINIVIL,ZESTRIL) 40 MG tablet TAKE 1 TABLET PO DAILY      metoprolol succinate (TOPROL-XL) 50 MG 24 hr tablet Take 50 mg by mouth.      metoprolol tartrate (LOPRESSOR) 25 MG tablet Take 25 mg by mouth 2 (two) times daily.      pravastatin (PRAVACHOL) 80 MG tablet TK 1 T PO QD  " "4    econazole nitrate 1 % cream Apply topically 2 (two) times daily. 85 g 1    terbinafine HCL (LAMISIL) 1 % cream Apply topically Daily. To affected toenails. (Patient not taking: Reported on 7/1/2024) 15 g 3     No current facility-administered medications on file prior to visit.     Review of patient's allergies indicates:  No Known Allergies    Review of Systems   Constitutional: Negative for chills, decreased appetite, fever, malaise/fatigue, night sweats, weight gain and weight loss.   Cardiovascular:  Positive for leg swelling. Negative for chest pain, claudication, dyspnea on exertion, palpitations and syncope.   Respiratory:  Negative for cough and shortness of breath.    Endocrine: Negative for cold intolerance and heat intolerance.   Hematologic/Lymphatic: Negative for bleeding problem. Does not bruise/bleed easily.   Skin:  Positive for color change. Negative for dry skin, flushing, itching, nail changes, poor wound healing, rash, skin cancer, suspicious lesions and unusual hair distribution.   Musculoskeletal:  Positive for joint swelling. Negative for arthritis, back pain, falls, gout, joint pain, muscle cramps, muscle weakness, myalgias, neck pain and stiffness.   Gastrointestinal:  Negative for diarrhea, nausea and vomiting.   Neurological:  Positive for numbness and paresthesias. Negative for dizziness, focal weakness, light-headedness, tremors, vertigo and weakness.   Psychiatric/Behavioral:  Negative for altered mental status and depression. The patient does not have insomnia.    Allergic/Immunologic: Negative.            Objective:       Vitals:    07/01/24 1400   BP: 128/82   Pulse: 73   Height: 5' 6" (1.676 m)   PainSc:   3   PainLoc: Foot         Physical Exam  Vitals reviewed.   Constitutional:       General: She is not in acute distress.     Appearance: She is well-developed. She is not ill-appearing, toxic-appearing or diaphoretic.      Comments:  Surgical shoe/tubigrip     "   Cardiovascular:      Pulses:           Dorsalis pedis pulses are 2+ on the right side and 2+ on the left side.        Posterior tibial pulses are 1+ on the right side and 1+ on the left side.      Comments: nonPitting edema   Musculoskeletal:         General: No tenderness.      Right lower leg: Edema (improved) present.      Left lower leg: No edema.      Right ankle: Normal.      Right Achilles Tendon: Normal.      Left ankle: Normal.      Left Achilles Tendon: Normal.      Right foot: No tenderness or bony tenderness.      Left foot: No tenderness or bony tenderness.      Comments: No pain on palpation   Feet:      Right foot:      Protective Sensation: 10 sites tested.  0 sites sensed.      Skin integrity: Skin breakdown, callus and dry skin present. No ulcer, blister, erythema or warmth.      Left foot:      Protective Sensation: 10 sites tested.  0 sites sensed.      Skin integrity: No ulcer, blister, skin breakdown, erythema, warmth, callus or dry skin.      Comments: Sub 5th metatarsophalangeal joint  no signs of infection  5th digit blanchable erythema  Sub 4th metatarsophalangeal joint ulcers superficial puncture wound  Ulcer location:  right, plantar foot    Measurements: 0.2cm x 0.2cm x 0.1cm      Signs of infection: no  Erythema: Yes blanchable    IS mild dark hpk, no fluctuance or crepitus   No excessive warmth      Skin:     General: Skin is warm and dry.      Capillary Refill: Capillary refill takes 2 to 3 seconds.      Coloration: Skin is not pale.      Findings: No erythema or rash.      Comments: Right plantar foot, around sub 5th met healed  Peeling, non-viable skin removed to reveal thin, intact skin    No fluctuance under puncture areas   No increase in temp or erythema    Area in 4th interspace healed, dry   Neurological:      Mental Status: She is alert and oriented to person, place, and time.      Sensory: Sensory deficit present.      Gait: Gait abnormal.   Psychiatric:         Attention  and Perception: Attention normal.         Mood and Affect: Mood normal.         Speech: Speech normal.         Behavior: Behavior normal.         Thought Content: Thought content normal.         Cognition and Memory: Cognition normal.         Judgment: Judgment normal.         Xray:  TECHNIQUE:  AP, lateral, and oblique views of the right foot were performed.     COMPARISON:  None     FINDINGS:  No acute fracture or dislocation. Alignment is normal. The Lisfranc articulation is congruent. There is joint space narrowing of the great toe metatarsophalangeal joint with marginal osteophytes.  There are vascular calcifications.  There is no radiopaque foreign body in the field.     Impression:     No radiopaque foreign body.        Electronically signed by:Moses Garrett  Date:                                            06/16/2024  Time:                                           22:47    CT Foot With Contrast Right  Order: [id number]  Status: Final result       Visible to patient: Yes (seen)       Next appt: 06/28/2024 at 08:30 AM in Podiatry (Elaine Leigh DPM)    0 Result Notes  Details    Reading Physician Reading Date Result Priority   Moses Garrett, DO  [phone] 6/17/2024 STAT     Narrative & Impression  EXAMINATION:  CT FOOT WITH CONTRAST RIGHT     CLINICAL HISTORY:  cellulitis, r/o gasforming infection;     TECHNIQUE:  Axial CT images of the right foot with sagittal and coronal reformats after the intravenous administration of 100 mL Omnipaque 350.     COMPARISON:  Radiographs from 06/16/2024.     FINDINGS:  Bones are intact.  There is no acute fracture or dislocation.  Alignment is normal.  There is no CT evidence of acute osteomyelitis.  There is joint space narrowing of the great toe metatarsophalangeal joint, mild, with marginal osteophytes.  There is a small cutaneous soft tissue defect of the lateral aspect of the foot, overlying the 5th metacarpophalangeal joint, with mild underlying soft tissue  edema.  There is no evidence of soft tissue gas or evidence of a rim enhancing fluid collection or abscess.  There is no evidence of a radiopaque foreign body.  There is mild dorsal soft tissue edema of the forefoot.  There is moderate fatty atrophy of the visualized musculature.  There are vascular calcifications.     Impression:     Small soft tissue defect/laceration overlying the 5th metatarsophalangeal joint with mild underlying soft tissue edema, correlate for cellulitis.  No abscess, soft tissue gas, or CT evidence of acute osteomyelitis.        Electronically signed by:Moses Garrett  Date:                                            06/17/2024  Time:                                           01:57                  Narrative & Impression  EXAMINATION:  US LOWER EXTREMITY ARTERIES BILATERAL     CLINICAL HISTORY:  Unspecified atherosclerosis of native arteries of extremities, bilateral legs     TECHNIQUE:  Bilateral lower extremity arterial duplex ultrasound examination performed. Multiple gray scale and color doppler images were obtained in addition to waveform analysis.  Ankle-brachial indices were calculated.     COMPARISON:  Bilateral lower extremity arterial ultrasound dated 12/17/2014     FINDINGS:  The peak systolic velocities on the right are as follows, in centimeters/second:     Common femoral artery: 167, triphasic     Deep profundus: 52, biphasic     Superficial femoral artery, proximal: 142, triphasic     Superficial femoral artery, mid portion: 145, triphasic     Superficial femoral artery, distal: 101, triphasic     Popliteal artery: 86, triphasic     Distal popliteal artery: 100, biphasic     Anterior tibial artery: 60, biphasic     Posterior tibial artery: 64, biphasic     Other: Mildly complex cystic structure at the left popliteal region extending to the proximal medial calf suggesting Baker's cyst measuring 7.9 x 1.7 x 4.1 cm.     The peak systolic velocities on the left are as follows, in  centimeters/second:     Common femoral artery: 125, triphasic     Deep profundus: 50, biphasic     Superficial femoral artery, proximal: 113, triphasic     Superficial femoral artery, mid portion: 100, triphasic     Superficial femoral artery, distal: 149, triphasic     Popliteal artery: 85, biphasic     Distal popliteal artery: 71, triphasic     Anterior tibial artery: 53, biphasic     Posterior tibial artery: 49, triphasic     Other: None     Impression:     Velocity parameters as above.  No abnormal velocity doubling to suggest hemodynamically significant stenosis.     Mildly complex cystic structure at the left popliteal region extending to the proximal medial calf suggesting Baker's cyst.        Electronically signed by:Nabor Urias  Date:                                            08/04/2023  Time:                                           12:11        Assessment:       Encounter Diagnosis   Name Primary?    Foot injury, right, subsequent encounter Yes           Plan:       Jamila was seen today for wound care.    Diagnoses and all orders for this visit:    Foot injury, right, subsequent encounter        I counseled the patient on her conditions, their implications and medical management.      Pt advised area of puncture wound and interspace is healed   Pt advised to monitor for re-opening   Pt advised to wear darco  for next 7 days, until skin matures  Pt advised she can shower but no soaking    RTC in 4 weeks or sooner if any new pedal problems should arise or if condition worsens.

## 2024-07-29 ENCOUNTER — PATIENT MESSAGE (OUTPATIENT)
Dept: PODIATRY | Facility: CLINIC | Age: 59
End: 2024-07-29
Payer: COMMERCIAL

## 2024-07-31 ENCOUNTER — OFFICE VISIT (OUTPATIENT)
Dept: PODIATRY | Facility: CLINIC | Age: 59
End: 2024-07-31
Payer: COMMERCIAL

## 2024-07-31 VITALS
WEIGHT: 182.13 LBS | SYSTOLIC BLOOD PRESSURE: 144 MMHG | DIASTOLIC BLOOD PRESSURE: 85 MMHG | HEIGHT: 66 IN | HEART RATE: 78 BPM | BODY MASS INDEX: 29.27 KG/M2

## 2024-07-31 DIAGNOSIS — S99.921D FOOT INJURY, RIGHT, SUBSEQUENT ENCOUNTER: Primary | ICD-10-CM

## 2024-07-31 DIAGNOSIS — E11.49 TYPE II DIABETES MELLITUS WITH NEUROLOGICAL MANIFESTATIONS: ICD-10-CM

## 2024-07-31 DIAGNOSIS — Z87.2 HEALED ULCER OF RIGHT FOOT ON EXAMINATION: ICD-10-CM

## 2024-07-31 PROCEDURE — 1160F RVW MEDS BY RX/DR IN RCRD: CPT | Mod: CPTII,S$GLB,, | Performed by: PODIATRIST

## 2024-07-31 PROCEDURE — 99214 OFFICE O/P EST MOD 30 MIN: CPT | Mod: S$GLB,,, | Performed by: PODIATRIST

## 2024-07-31 PROCEDURE — 99999 PR PBB SHADOW E&M-EST. PATIENT-LVL IV: CPT | Mod: PBBFAC,,, | Performed by: PODIATRIST

## 2024-07-31 PROCEDURE — 4010F ACE/ARB THERAPY RXD/TAKEN: CPT | Mod: CPTII,S$GLB,, | Performed by: PODIATRIST

## 2024-07-31 PROCEDURE — 1159F MED LIST DOCD IN RCRD: CPT | Mod: CPTII,S$GLB,, | Performed by: PODIATRIST

## 2024-07-31 PROCEDURE — 3008F BODY MASS INDEX DOCD: CPT | Mod: CPTII,S$GLB,, | Performed by: PODIATRIST

## 2024-07-31 PROCEDURE — 3077F SYST BP >= 140 MM HG: CPT | Mod: CPTII,S$GLB,, | Performed by: PODIATRIST

## 2024-07-31 PROCEDURE — 3079F DIAST BP 80-89 MM HG: CPT | Mod: CPTII,S$GLB,, | Performed by: PODIATRIST

## 2024-07-31 NOTE — PROGRESS NOTES
"Subjective:      Patient ID: Jamila Xiong is a 58 y.o. female.    Chief Complaint:   Wound Check (Right, 4wk f/u after being healed from cellulitis, some redness)    Jamila is a 58 y.o. female who presents to the clinic for evaluation and treatment of high risk feet. Jamila has a past medical history of Abnormal Pap smear, appendectomy (), and Hypertension. The patient's chief complaint is f/u diabetic foot ulcer      Pt relates she is doing well. Some redness... no heat.   Swelling improved.   Avoiding lotion to area  No pain    Saw Dr. Barrios 24:   Pt advised area of puncture wound and interspace is healed   Pt advised to monitor for re-opening   Pt advised to wear darco  for next 7 days, until skin matures  Pt advised she can shower but no soaking     RTC in 4 weeks or sooner if any new pedal problems should arise or if condition worsens.        PCP: Tushar Pino MD     Date Last Seen by PCP: 2024    No results found for: "HGBA1C" 9.     Past Medical History:   Diagnosis Date    Abnormal Pap smear     Hx of appendectomy     Hypertension      Past Surgical History:   Procedure Laterality Date     SECTION      DILATION AND CURETTAGE OF UTERUS      TUBAL LIGATION       Current Outpatient Medications on File Prior to Visit   Medication Sig Dispense Refill    allopurinoL (ZYLOPRIM) 300 MG tablet Take 300 mg by mouth.      BD MARIS 2ND GEN PEN NEEDLE 32 gauge x " Ndle SMARTSIG:Injection Daily      buPROPion (WELLBUTRIN SR) 150 MG TBSR 12 hr tablet Take 150 mg by mouth daily as needed.      FREESTYLE LITE STRIPS Strp TEST ONCE D      gabapentin (NEURONTIN) 800 MG tablet Take 800 mg by mouth 3 (three) times daily.      glipiZIDE 5 MG TR24 Take 5 mg by mouth every morning.      JANUMET -1,000 mg TM24 Take 1 tablet by mouth.      LEVEMIR FLEXPEN 100 unit/mL (3 mL) InPn pen INJECT 20 UNITS INTO THE SKIN EVERY DAY      levoFLOXacin (LEVAQUIN) 750 MG tablet Take 1 tablet (750 mg total) by " mouth once daily. 7 tablet 0    lisinopriL (PRINIVIL,ZESTRIL) 40 MG tablet TAKE 1 TABLET PO DAILY      metoprolol succinate (TOPROL-XL) 50 MG 24 hr tablet Take 50 mg by mouth.      metoprolol tartrate (LOPRESSOR) 25 MG tablet Take 25 mg by mouth 2 (two) times daily.      pravastatin (PRAVACHOL) 80 MG tablet TK 1 T PO QD  4    terbinafine HCL (LAMISIL) 1 % cream Apply topically Daily. To affected toenails. 15 g 3    econazole nitrate 1 % cream Apply topically 2 (two) times daily. 85 g 1     No current facility-administered medications on file prior to visit.     Review of patient's allergies indicates:  No Known Allergies    Review of Systems   Constitutional: Negative for chills, decreased appetite, fever, malaise/fatigue, night sweats, weight gain and weight loss.   Cardiovascular:  Positive for leg swelling. Negative for chest pain, claudication, dyspnea on exertion, palpitations and syncope.   Respiratory:  Negative for cough and shortness of breath.    Endocrine: Negative for cold intolerance and heat intolerance.   Hematologic/Lymphatic: Negative for bleeding problem. Does not bruise/bleed easily.   Skin:  Positive for color change. Negative for dry skin, flushing, itching, nail changes, poor wound healing, rash, skin cancer, suspicious lesions and unusual hair distribution.   Musculoskeletal:  Positive for joint swelling. Negative for arthritis, back pain, falls, gout, joint pain, muscle cramps, muscle weakness, myalgias, neck pain and stiffness.   Gastrointestinal:  Negative for diarrhea, nausea and vomiting.   Neurological:  Positive for numbness and paresthesias. Negative for dizziness, focal weakness, light-headedness, tremors, vertigo and weakness.   Psychiatric/Behavioral:  Negative for altered mental status and depression. The patient does not have insomnia.    Allergic/Immunologic: Negative.            Objective:       Vitals:    07/31/24 0922   BP: (!) 144/85   Pulse: 78   Weight: 82.6 kg (182 lb 1.6  "oz)   Height: 5' 6" (1.676 m)   PainSc: 0-No pain   PainLoc: Foot   82.6 kg (182 lb 1.6 oz)     Physical Exam  Vitals reviewed.   Constitutional:       General: She is not in acute distress.     Appearance: She is well-developed. She is not ill-appearing, toxic-appearing or diaphoretic.      Comments:    Slides    Cardiovascular:      Pulses:           Dorsalis pedis pulses are 2+ on the right side and 2+ on the left side.        Posterior tibial pulses are 1+ on the right side and 1+ on the left side.      Comments: nonPitting edema   Musculoskeletal:         General: No tenderness.      Right lower leg: No edema.      Left lower leg: No edema.      Right ankle: Normal.      Right Achilles Tendon: Normal.      Left ankle: Normal.      Left Achilles Tendon: Normal.      Right foot: Prominent metatarsal heads present. No tenderness or bony tenderness.      Left foot: Prominent metatarsal heads present. No tenderness or bony tenderness.      Comments: No pain on palpation   Feet:      Right foot:      Protective Sensation: 10 sites tested.  0 sites sensed.      Skin integrity: Dry skin present. No ulcer, blister, skin breakdown, erythema, warmth or callus.      Left foot:      Protective Sensation: 10 sites tested.  0 sites sensed.      Skin integrity: Dry skin present. No ulcer, blister, skin breakdown, erythema, warmth or callus.      Comments:           Skin:     General: Skin is warm and dry.      Capillary Refill: Capillary refill takes 2 to 3 seconds.      Coloration: Skin is not pale.      Findings: No erythema or rash.   Neurological:      Mental Status: She is alert and oriented to person, place, and time.      Sensory: Sensory deficit present.      Gait: Gait abnormal.   Psychiatric:         Attention and Perception: Attention normal.         Mood and Affect: Mood normal.         Speech: Speech normal.         Behavior: Behavior normal.         Thought Content: Thought content normal. "         Xray:  TECHNIQUE:  AP, lateral, and oblique views of the right foot were performed.     COMPARISON:  None     FINDINGS:  No acute fracture or dislocation. Alignment is normal. The Lisfranc articulation is congruent. There is joint space narrowing of the great toe metatarsophalangeal joint with marginal osteophytes.  There are vascular calcifications.  There is no radiopaque foreign body in the field.     Impression:     No radiopaque foreign body.        Electronically signed by:Moses Garrett  Date:                                            06/16/2024  Time:                                           22:47    CT Foot With Contrast Right  Order: [id number]  Status: Final result       Visible to patient: Yes (seen)       Next appt: 06/28/2024 at 08:30 AM in Podiatry (Elaine Leigh DPM)    0 Result Notes  Details    Reading Physician Reading Date Result Priority   Moses Garrett,   [phone] 6/17/2024 STAT     Narrative & Impression  EXAMINATION:  CT FOOT WITH CONTRAST RIGHT     CLINICAL HISTORY:  cellulitis, r/o gasforming infection;     TECHNIQUE:  Axial CT images of the right foot with sagittal and coronal reformats after the intravenous administration of 100 mL Omnipaque 350.     COMPARISON:  Radiographs from 06/16/2024.     FINDINGS:  Bones are intact.  There is no acute fracture or dislocation.  Alignment is normal.  There is no CT evidence of acute osteomyelitis.  There is joint space narrowing of the great toe metatarsophalangeal joint, mild, with marginal osteophytes.  There is a small cutaneous soft tissue defect of the lateral aspect of the foot, overlying the 5th metacarpophalangeal joint, with mild underlying soft tissue edema.  There is no evidence of soft tissue gas or evidence of a rim enhancing fluid collection or abscess.  There is no evidence of a radiopaque foreign body.  There is mild dorsal soft tissue edema of the forefoot.  There is moderate fatty atrophy of the visualized  musculature.  There are vascular calcifications.     Impression:     Small soft tissue defect/laceration overlying the 5th metatarsophalangeal joint with mild underlying soft tissue edema, correlate for cellulitis.  No abscess, soft tissue gas, or CT evidence of acute osteomyelitis.        Electronically signed by:Moses Garrett  Date:                                            06/17/2024  Time:                                           01:57                  Narrative & Impression  EXAMINATION:  US LOWER EXTREMITY ARTERIES BILATERAL     CLINICAL HISTORY:  Unspecified atherosclerosis of native arteries of extremities, bilateral legs     TECHNIQUE:  Bilateral lower extremity arterial duplex ultrasound examination performed. Multiple gray scale and color doppler images were obtained in addition to waveform analysis.  Ankle-brachial indices were calculated.     COMPARISON:  Bilateral lower extremity arterial ultrasound dated 12/17/2014     FINDINGS:  The peak systolic velocities on the right are as follows, in centimeters/second:     Common femoral artery: 167, triphasic     Deep profundus: 52, biphasic     Superficial femoral artery, proximal: 142, triphasic     Superficial femoral artery, mid portion: 145, triphasic     Superficial femoral artery, distal: 101, triphasic     Popliteal artery: 86, triphasic     Distal popliteal artery: 100, biphasic     Anterior tibial artery: 60, biphasic     Posterior tibial artery: 64, biphasic     Other: Mildly complex cystic structure at the left popliteal region extending to the proximal medial calf suggesting Baker's cyst measuring 7.9 x 1.7 x 4.1 cm.     The peak systolic velocities on the left are as follows, in centimeters/second:     Common femoral artery: 125, triphasic     Deep profundus: 50, biphasic     Superficial femoral artery, proximal: 113, triphasic     Superficial femoral artery, mid portion: 100, triphasic     Superficial femoral artery, distal: 149, triphasic      Popliteal artery: 85, biphasic     Distal popliteal artery: 71, triphasic     Anterior tibial artery: 53, biphasic     Posterior tibial artery: 49, triphasic     Other: None     Impression:     Velocity parameters as above.  No abnormal velocity doubling to suggest hemodynamically significant stenosis.     Mildly complex cystic structure at the left popliteal region extending to the proximal medial calf suggesting Baker's cyst.        Electronically signed by:Nabor Urias  Date:                                            08/04/2023  Time:                                           12:11            Assessment:       Encounter Diagnoses   Name Primary?    Foot injury, right, subsequent encounter Yes    Healed ulcer of right foot on examination     Type II diabetes mellitus with neurological manifestations              Plan:       Jamila was seen today for wound check.    Diagnoses and all orders for this visit:    Foot injury, right, subsequent encounter    Healed ulcer of right foot on examination    Type II diabetes mellitus with neurological manifestations          I counseled the patient on her conditions, their implications and medical management.       - doing well    - With patient's permission, the toenails mentioned above were aggressively reduced and debrided using a nail nipper, removing all offending nail and debris.     The patient will continue to monitor the areas daily, inspect the feet, wear protective shoe gear when ambulatory, and moisturizer to maintain skin integrity.     - d/w pt shoegear    - ok to use lotion    - f/u 3 months sooner if needed

## 2024-10-01 DIAGNOSIS — M81.0 OP (OSTEOPOROSIS): ICD-10-CM

## 2024-10-01 DIAGNOSIS — Z12.31 SCREENING MAMMOGRAM, ENCOUNTER FOR: Primary | ICD-10-CM

## 2024-10-24 ENCOUNTER — PATIENT MESSAGE (OUTPATIENT)
Dept: PODIATRY | Facility: CLINIC | Age: 59
End: 2024-10-24
Payer: COMMERCIAL

## 2025-01-04 ENCOUNTER — OFFICE VISIT (OUTPATIENT)
Dept: URGENT CARE | Facility: CLINIC | Age: 60
End: 2025-01-04
Payer: COMMERCIAL

## 2025-01-04 VITALS
OXYGEN SATURATION: 95 % | DIASTOLIC BLOOD PRESSURE: 84 MMHG | BODY MASS INDEX: 29.05 KG/M2 | HEIGHT: 66 IN | SYSTOLIC BLOOD PRESSURE: 133 MMHG | RESPIRATION RATE: 16 BRPM | HEART RATE: 96 BPM | WEIGHT: 180.75 LBS | TEMPERATURE: 100 F

## 2025-01-04 DIAGNOSIS — J10.1 INFLUENZA A: Primary | ICD-10-CM

## 2025-01-04 DIAGNOSIS — R05.9 COUGH, UNSPECIFIED TYPE: ICD-10-CM

## 2025-01-04 DIAGNOSIS — J10.1 INFLUENZA A: ICD-10-CM

## 2025-01-04 LAB
CTP QC/QA: YES
CTP QC/QA: YES
POC MOLECULAR INFLUENZA A AGN: POSITIVE
POC MOLECULAR INFLUENZA B AGN: NEGATIVE
SARS-COV-2 AG RESP QL IA.RAPID: NEGATIVE

## 2025-01-04 PROCEDURE — 87811 SARS-COV-2 COVID19 W/OPTIC: CPT | Mod: QW,S$GLB,, | Performed by: NURSE PRACTITIONER

## 2025-01-04 PROCEDURE — 99213 OFFICE O/P EST LOW 20 MIN: CPT | Mod: S$GLB,,, | Performed by: NURSE PRACTITIONER

## 2025-01-04 PROCEDURE — 87502 INFLUENZA DNA AMP PROBE: CPT | Mod: QW,S$GLB,, | Performed by: NURSE PRACTITIONER

## 2025-01-04 RX ORDER — LEVOCETIRIZINE DIHYDROCHLORIDE 5 MG/1
5 TABLET, FILM COATED ORAL NIGHTLY
Qty: 30 TABLET | Refills: 0 | Status: SHIPPED | OUTPATIENT
Start: 2025-01-04 | End: 2025-01-04

## 2025-01-04 RX ORDER — BENZONATATE 100 MG/1
100 CAPSULE ORAL 3 TIMES DAILY PRN
Qty: 30 CAPSULE | Refills: 0 | Status: SHIPPED | OUTPATIENT
Start: 2025-01-04 | End: 2025-01-14

## 2025-01-04 RX ORDER — OSELTAMIVIR PHOSPHATE 75 MG/1
75 CAPSULE ORAL 2 TIMES DAILY
Qty: 10 CAPSULE | Refills: 0 | Status: SHIPPED | OUTPATIENT
Start: 2025-01-04 | End: 2025-01-09

## 2025-01-04 RX ORDER — FLUTICASONE PROPIONATE 50 MCG
2 SPRAY, SUSPENSION (ML) NASAL DAILY
Qty: 11.1 ML | Refills: 0 | Status: SHIPPED | OUTPATIENT
Start: 2025-01-04 | End: 2025-01-04

## 2025-01-04 RX ORDER — FLUTICASONE PROPIONATE 50 MCG
SPRAY, SUSPENSION (ML) NASAL
Qty: 48 G | Refills: 0 | Status: SHIPPED | OUTPATIENT
Start: 2025-01-04

## 2025-01-04 RX ORDER — LEVOCETIRIZINE DIHYDROCHLORIDE 5 MG/1
5 TABLET, FILM COATED ORAL NIGHTLY
Qty: 90 TABLET | Refills: 0 | Status: SHIPPED | OUTPATIENT
Start: 2025-01-04

## 2025-01-04 NOTE — PROGRESS NOTES
"Subjective:      Patient ID: Jamila Xiong is a 59 y.o. female.    Vitals:  height is 5' 6" (1.676 m) and weight is 82 kg (180 lb 12.4 oz). Her oral temperature is 100.1 °F (37.8 °C). Her blood pressure is 133/84 and her pulse is 96. Her respiration is 16 and oxygen saturation is 95%.     Chief Complaint: Cough (Entered by patient)    58yo female pt reports dry cough and sinus congestion, fever (Tmax 102.0F at home this morning), and fatigue for the past 2 days.  Denies n/v/d, denies abd pain.  Denies chest pain, wheezing, or SOB.  Denies known sick contacts, but reports recent air travel.  Reports no improvement with Tylenol or ibuprofen.  Denies receiving COVID or flu vaccinations.    Cough  This is a new problem. The current episode started in the past 7 days. The problem has been gradually worsening. The problem occurs constantly. The cough is Non-productive. Associated symptoms include chills, a fever, nasal congestion and postnasal drip. Pertinent negatives include no chest pain, ear pain, headaches, sore throat, shortness of breath or wheezing. Nothing aggravates the symptoms. She has tried OTC cough suppressant for the symptoms. The treatment provided no relief.     Constitution: Positive for chills, fatigue and fever.   HENT:  Positive for congestion and postnasal drip. Negative for ear pain and sore throat.    Cardiovascular:  Negative for chest pain.   Respiratory:  Positive for cough. Negative for chest tightness, sputum production, shortness of breath and wheezing.    Gastrointestinal:  Negative for abdominal pain, nausea, vomiting and diarrhea.   Neurological:  Negative for headaches.      Objective:     Physical Exam   Constitutional: She is oriented to person, place, and time. She appears well-developed. She is cooperative.  Non-toxic appearance. She does not appear ill. No distress.   HENT:   Head: Normocephalic and atraumatic.   Ears:   Right Ear: Hearing, external ear and ear canal normal. " Tympanic membrane is bulging. Tympanic membrane is not erythematous and not retracted. A middle ear effusion (clear fluid) is present.   Left Ear: Hearing, external ear and ear canal normal. Tympanic membrane is bulging. Tympanic membrane is not erythematous and not retracted. A middle ear effusion (clear fluid) is present.   Nose: Mucosal edema (erythema to BL turbinates) and rhinorrhea (clear to BL nares) present. No purulent discharge or nasal deformity. No epistaxis. Right sinus exhibits no maxillary sinus tenderness and no frontal sinus tenderness. Left sinus exhibits no maxillary sinus tenderness and no frontal sinus tenderness.   Mouth/Throat: Uvula is midline and mucous membranes are normal. No trismus in the jaw. Normal dentition. No uvula swelling. Oropharyngeal exudate (clear postnasal drip) and posterior oropharyngeal erythema (mild) present. No posterior oropharyngeal edema. Tonsils are 1+ on the right. Tonsils are 1+ on the left. No tonsillar exudate.   Eyes: Conjunctivae and lids are normal. No scleral icterus.   Neck: Trachea normal and phonation normal. Neck supple. No edema present. No erythema present. No neck rigidity present.   Cardiovascular: Normal rate, regular rhythm, normal heart sounds and normal pulses.   Pulmonary/Chest: Effort normal and breath sounds normal. No accessory muscle usage or stridor. No tachypnea. No respiratory distress. She has no decreased breath sounds. She has no wheezes. She has no rhonchi. She has no rales.   Abdominal: Normal appearance.   Musculoskeletal: Normal range of motion.         General: No deformity. Normal range of motion.   Lymphadenopathy:        Head (right side): No submandibular adenopathy present.        Head (left side): No submandibular adenopathy present.     She has no cervical adenopathy.   Neurological: She is alert and oriented to person, place, and time. She exhibits normal muscle tone. Coordination normal.   Skin: Skin is warm, dry, intact,  not diaphoretic and not pale.   Psychiatric: Her speech is normal and behavior is normal. Judgment and thought content normal.   Nursing note and vitals reviewed.    Results for orders placed or performed in visit on 01/04/25   POCT Influenza A/B MOLECULAR    Collection Time: 01/04/25 12:50 PM   Result Value Ref Range    POC Molecular Influenza A Ag Positive (A) Negative    POC Molecular Influenza B Ag Negative Negative     Acceptable Yes    SARS Coronavirus 2 Antigen, POCT Manual Read    Collection Time: 01/04/25 12:50 PM   Result Value Ref Range    SARS Coronavirus 2 Antigen Negative Negative     Acceptable Yes          Assessment:     1. Influenza A    2. Cough, unspecified type        Plan:     Provided education on prescribed medications, provided printed Tamiflu RX.  Recommended alternating Tylenol/ibuprofen for elevated temperature and pain, if needed.  Provided education on good handwashing and masking/isolation precautions.  Provided education on return/ER precautions.  Pt verbalized understanding and agreed to plan.      Influenza A  -     oseltamivir (TAMIFLU) 75 MG capsule; Take 1 capsule (75 mg total) by mouth 2 (two) times daily. for 5 days  Dispense: 10 capsule; Refill: 0  -     levocetirizine (XYZAL) 5 MG tablet; Take 1 tablet (5 mg total) by mouth every evening.  Dispense: 30 tablet; Refill: 0  -     fluticasone propionate (FLONASE) 50 mcg/actuation nasal spray; 2 sprays (100 mcg total) by Each Nostril route once daily.  Dispense: 11.1 mL; Refill: 0    Cough, unspecified type  -     benzonatate (TESSALON) 100 MG capsule; Take 1 capsule (100 mg total) by mouth 3 (three) times daily as needed for Cough.  Dispense: 30 capsule; Refill: 0  -     POCT Influenza A/B MOLECULAR  -     SARS Coronavirus 2 Antigen, POCT Manual Read      Patient Instructions   If your condition worsens or fails to improve, we recommend that you receive another evaluation at the ER immediately  "contact your PCP to discuss your concerns, or return here.  You must understand that you've received an urgent care treatment only, and that you may be released before all your medical problems are known or treated.  You, the patient, will arrange for follow-up care as instructed.     If we discussed that I think your illness is viral, it will not respond to antibiotics and will last 10-14 days.  If we discussed "wait and see" antibiotics, and if over the next few days the symptoms worsen, start the antibiotics I have given you.     If you are female and on birth control pills and do take the antibiotics, use additional methods to prevent pregnancy while on the antibiotics and for one cycle after.     Flonase (fluticasone) is a nasal spray which is available over the counter and may help with your symptoms.  Zyrtec D, Claritin D, or Allegra D can also help with symptoms of congestion and drainage.  If you have hypertension, avoid using the "D" which is the decongestant formula.    If you just have drainage, you can take plain Zyrtec, Claritin, or Allegra.  If you just have a congested feeling, you can take pseudoephedrine (unless you have high blood pressure), which you have to sign for behind the counter.  Do not buy phenylephrine OTC, as it is not effective.    Rest and fluids are also important.  Tylenol or ibuprofen can also be used as directed for pain, unless you have an allergy to them or medical condition (such as stomach ulcers, kidney or liver disease, or use blood thinners, etc.) for which you should not be taking these type of medications.     If you are flying in the next few days, Afrin nose drops for the airplane flight upon take off and landing may help.  Other than at those times, refrain from using Afrin.     If you were prescribed a narcotic or sedating cough medicine, do not drive or operate heavy machinery while taking these medications.                       "

## 2025-01-04 NOTE — LETTER
January 4, 2025      [facility] [place] [address]  [place] [address]  Phone: [phone]  Fax: [phone]       Patient: Jamila Xiong   YOB: 1965  Date of Visit: 01/04/2025    To Whom It May Concern:    Amira Xiong  was at [facility] on 01/04/2025. The patient may return to work/school on 1/8/2025 with no restrictions, if afebrile without the use of medications. If you have any questions or concerns, or if I can be of further assistance, please do not hesitate to contact me.    Sincerely,          Willem Aguero NP

## 2025-01-09 ENCOUNTER — OFFICE VISIT (OUTPATIENT)
Dept: URGENT CARE | Facility: CLINIC | Age: 60
End: 2025-01-09
Payer: COMMERCIAL

## 2025-01-09 VITALS
TEMPERATURE: 98 F | RESPIRATION RATE: 17 BRPM | HEIGHT: 66 IN | WEIGHT: 180 LBS | SYSTOLIC BLOOD PRESSURE: 152 MMHG | BODY MASS INDEX: 28.93 KG/M2 | DIASTOLIC BLOOD PRESSURE: 83 MMHG | OXYGEN SATURATION: 97 % | HEART RATE: 87 BPM

## 2025-01-09 DIAGNOSIS — J20.8 ACUTE BRONCHITIS DUE TO OTHER SPECIFIED ORGANISMS: Primary | ICD-10-CM

## 2025-01-09 DIAGNOSIS — R06.2 WHEEZING: ICD-10-CM

## 2025-01-09 DIAGNOSIS — R05.8 OTHER COUGH: ICD-10-CM

## 2025-01-09 DIAGNOSIS — R09.81 NASAL CONGESTION: ICD-10-CM

## 2025-01-09 PROBLEM — G63 POLYNEUROPATHY ASSOCIATED WITH UNDERLYING DISEASE: Status: ACTIVE | Noted: 2023-04-04

## 2025-01-09 PROBLEM — E78.5 HYPERLIPIDEMIA: Status: ACTIVE | Noted: 2025-01-09

## 2025-01-09 PROBLEM — E11.9 DIABETES MELLITUS: Status: ACTIVE | Noted: 2025-01-09

## 2025-01-09 RX ORDER — DEXAMETHASONE SODIUM PHOSPHATE 10 MG/ML
10 INJECTION INTRAMUSCULAR; INTRAVENOUS
Status: COMPLETED | OUTPATIENT
Start: 2025-01-09 | End: 2025-01-09

## 2025-01-09 RX ORDER — ALBUTEROL SULFATE 90 UG/1
2 INHALANT RESPIRATORY (INHALATION) EVERY 4 HOURS PRN
Qty: 18 G | Refills: 0 | Status: SHIPPED | OUTPATIENT
Start: 2025-01-09 | End: 2025-02-08

## 2025-01-09 RX ORDER — PROMETHAZINE HYDROCHLORIDE AND DEXTROMETHORPHAN HYDROBROMIDE 6.25; 15 MG/5ML; MG/5ML
5 SYRUP ORAL EVERY 6 HOURS PRN
Qty: 180 ML | Refills: 0 | Status: SHIPPED | OUTPATIENT
Start: 2025-01-09 | End: 2025-01-18

## 2025-01-09 RX ORDER — IPRATROPIUM BROMIDE 0.5 MG/2.5ML
0.5 SOLUTION RESPIRATORY (INHALATION)
Status: COMPLETED | OUTPATIENT
Start: 2025-01-09 | End: 2025-01-09

## 2025-01-09 RX ORDER — ALBUTEROL SULFATE 0.83 MG/ML
2.5 SOLUTION RESPIRATORY (INHALATION)
Status: COMPLETED | OUTPATIENT
Start: 2025-01-09 | End: 2025-01-09

## 2025-01-09 RX ORDER — PREDNISONE 20 MG/1
20 TABLET ORAL 2 TIMES DAILY
Qty: 6 TABLET | Refills: 0 | Status: SHIPPED | OUTPATIENT
Start: 2025-01-09 | End: 2025-01-12

## 2025-01-09 RX ADMIN — ALBUTEROL SULFATE 2.5 MG: 0.83 SOLUTION RESPIRATORY (INHALATION) at 10:01

## 2025-01-09 RX ADMIN — DEXAMETHASONE SODIUM PHOSPHATE 10 MG: 10 INJECTION INTRAMUSCULAR; INTRAVENOUS at 10:01

## 2025-01-09 RX ADMIN — IPRATROPIUM BROMIDE 0.5 MG: 0.5 SOLUTION RESPIRATORY (INHALATION) at 10:01

## 2025-01-09 NOTE — LETTER
"  January 9, 2025      [facility] [place] [address]  [place] [address]  Phone: [phone]  Fax: [phone]       Patient: Jamila Xiong   YOB: 1965  Date of Visit: 01/09/2025    To Whom It May Concern:    Amira Xiong  was at [facility] on 01/09/2025. The patient may return to work/school on 1/20/25 with no restrictions. If you have any questions or concerns, or if I can be of further assistance, please do not hesitate to contact me.    Sincerely,        Sofienghia Leigh PA-C (Jackie)       "

## 2025-01-09 NOTE — PATIENT INSTRUCTIONS
Symptomatic care:  Make breathing easier: continue albuterol inhaler/nebulizer every 4h prn SOB  Control coughing: Promethazine-DM   Lower swelling in your airways: decadron injection   Treat infection: azithromycin   Control extra mucus: Guaifenesin 400 mg every 4h prn or 1200 mg every 12 hours sputum production.       Recommend oral antihistamine (claritin, zyrtec, allegra,xyzal),oral decongestant (pseudoephedrine)  for rhinorrhea/ear congestion <3 days if blood pressure is <130/80mmhg, steroid nasal spray (flonase), prescription (promethazine-DM) at night due to drowsiness  /OTC cough medicine (Robitussin DM every 4 to 6 hours or Mucinex DM 12 hour),  Tylenol (Acetaminophen) and/or Motrin (Ibuprofen) as directed for control of pain and/or fever.      Please drink plenty of fluids.  Please get plenty of rest.  Nasal irrigation with a saline spray or Netti Pot like device per their directions is also recommended.  If you  smoke, please stop smoking.    To help ease a sore throat, you can:  Use a sore throat spray.  Suck on hard candy or throat lozenges.  Gargle with warm saltwater a few times each day. Mix of 1/4 teaspoon (1.25 grams) salt in 8 ounces (240 mL) of warm water.  Use a cool mist humidifier to help you breathe easier.    If you negative (-) for a COVID test today and you are continuing to have symptoms, it is recommended to repeat the test in 48 hours x 3. If you continue to be negative, you may return to school/work once you have improved symptoms and no fever for 24 hours without any medications. This applies to all viral illnesses.       Discussed prescriptions and over-the-counter medicines to help with patient's symptoms:  A steroid nose spray (flonase) and antihistamine nasal spray (azelastine) can help with a stuffy nose. It can also help with drainage down the back of your throat.  An antihistamine (loratadine,zyrtec,allegra, xyzal) can help with itching, sneezing, or runny nose.  An  antihistamine eye drop can help with itchy eyes.  A decongestant (pseudoephedrine,  Phenylephrine, oxymetazoline aka afrin nasal spray) can help with a stuffy nose. Take <10 days for congestion and rhinorrhea. Once symptoms improve, proceed with loratadine/zyrtec once a day. These ingredients can keep you up all night, decrease appetite, feel jittery, and raise blood pressure with long term use.  OTC Coricidin can be used for patients with hypertension and palpitations because you cannot use ingredients such as pseudoephedrine and phenylephrine for oral decongestants.  Coricidin HBP Cough & Cold (Chlorpheniramine/Dextromethorphan)  Coricidin HBP Maximum Strength Multi-Symptom Flu  (Acetaminophen,Dextromethorphan, Chlorpheniramine)  Coricidin HBP® Maximum Strength Cold & Flu Day/Night (Acetaminophen,Dextromethorphan, Doxylamine, Guaifenesin)  Coricidin HBP Chest Congestion & Cough (Dextromethorphan + Guaifenesin)    Medications that control cough are suppressants and expectorants. Suppressants are tessalon pearls and dextromethorphan. If you have a productive cough with sputum, you need an expectorant called guaifenesin. Dextromethorphan and Guaifenesin are active ingredients in many OTC cough/cold medications such as Dayquil/Nyquil, Mucinex, and Robitussin Mucus+Chest Congestion.            Common Cold Medicine Ingredients Cheat sheet  Acetaminophen (APAP) -pain reliever/fever reducer  Dextromethorphan - cough suppressant  Guaifenesin - expectorant/thins and loosens mucus  Phenylephrine - nasal decongestant  Diphenhydramine or Doxylamine succinate - antihistamine, helps you fall asleep  Promethazine or Brompheniramine - Prescription strength antihistamines    These OTC cold medications are safe to use if you do not have high blood pressure (hypertension) or palpitations.  DayQuil and NyQuil - Cough, Cold & Flu Relief LiquiCaps  DayQuil: Acetaminophen, Dextromethorphan, and Phenylephrine   NyQuil: Acetaminophen,  Dextromethorphan, and Doxylamine  DayQuil and NyQuil SEVERE Maximum Strength Cough, Cold & Flu Relief LiquiCaps  DAYQUIL: Acetaminophen, Dextromethorphan, Guaifenesin, and Phenylephrine  NYQUIL: Acetaminophen, Dextromethorphan, Doxylamine, and Phenylephrine   Mucinex DM: Guaifenesin,Dextromethorphan  Mucinex Maximum Strength Sinus-Max® Pressure, Pain & Cough Liquid Gels: Acetaminophen/Dextromethorphan/ Guaifenesin/Phenylephrine       If not allergic, take Tylenol (Acetaminophen) 650 mg to  1 g every 6 hours as needed  and/or Motrin (Ibuprofen) 600 to 800 mg every 6 hours as needed for fever or pain.        Please remember that you have received care at an urgent care today. Urgent cares are not emergency rooms and are not equipped to handle life threatening emergencies and cannot rule in or out certain medical conditions and you may be released before all of your medical problems are known or treated.     Please arrange follow up with your primary care physician or speciality clinic within 2-5 days if your signs and symptoms have not resolved or worsen.     Patient can call our Referral Hotline at [phone] to make an appointment.      Please return here or go to the Emergency Department for any concerns or worsening of condition.  Signs of infection. These include a fever of 100.4°F (38°C) or higher, chills, cough, more sputum or change in color of sputum.  You are having so much trouble breathing that you can only say one or two words at a time.  You need to sit upright at all times to be able to breathe and or cannot lie down.  You have trouble breathing when talking or sitting still.  You have a fever of 100.4°F (38°C) or higher or chills.  You have chest pain when you cough, have trouble breathing but can still talk in full sentences, or cough up blood.

## 2025-01-09 NOTE — PROGRESS NOTES
"Subjective:      Patient ID: Jamila Xiong is a 59 y.o. female.    Vitals:  height is 5' 6" (1.676 m) and weight is 81.6 kg (180 lb). Her oral temperature is 98.2 °F (36.8 °C). Her blood pressure is 152/83 (abnormal) and her pulse is 87. Her respiration is 17 and oxygen saturation is 97%.     Chief Complaint: Cough (Entered by patient)    Jamila Xiong is a 59 y.o. female with hx of hypertension who complains of  cough, patient tested positive for flu Saturday (5 days ago; 1/4/25) and given meds  (tessalon pearls) but no relief. Pt started robitussin DM yesterday.     Symptoms started 1/2/25. 7 days ago.     Cough  This is a new problem. The current episode started in the past 7 days. The problem has been unchanged. The cough is Productive of sputum. Associated symptoms include nasal congestion, postnasal drip, shortness of breath and wheezing. Pertinent negatives include no chills, ear congestion, ear pain, fever, headaches, rhinorrhea or sore throat. She has tried OTC cough suppressant for the symptoms. The treatment provided mild relief. There is no history of asthma, bronchiectasis, bronchitis, COPD, emphysema, environmental allergies or pneumonia.       Constitution: Negative for chills, fatigue, fever and generalized weakness.   HENT:  Positive for congestion and postnasal drip. Negative for ear pain, ear discharge, foreign body in ear, tinnitus, sinus pain, sinus pressure, sore throat, trouble swallowing and voice change.    Respiratory:  Positive for sleep apnea, cough, sputum production, shortness of breath and wheezing. Negative for chest tightness and asthma.    Allergic/Immunologic: Negative for environmental allergies, seasonal allergies, food allergies and asthma.   Neurological:  Negative for headaches.      Objective:     Physical Exam   Constitutional: She is oriented to person, place, and time. She is cooperative. No distress.      Comments:Patient is awake and alert, sitting up in exam chair, " speaking and answering in complete sentences     normalawake  HENT:   Head: Normocephalic and atraumatic.      Comments: Patient observed  frequently clearing throat.    Ears:   Right Ear: Tympanic membrane, external ear and ear canal normal.   Left Ear: Tympanic membrane, external ear and ear canal normal.   Nose: Mucosal edema and congestion present. No rhinorrhea or sinus tenderness. Right sinus exhibits no maxillary sinus tenderness and no frontal sinus tenderness. Left sinus exhibits no maxillary sinus tenderness and no frontal sinus tenderness.   Mouth/Throat: Uvula is midline, oropharynx is clear and moist and mucous membranes are normal. Mucous membranes are moist. Tonsils are 0 on the right. Tonsils are 0 on the left. No tonsillar exudate. Oropharynx is clear.      Comments:  postnasal discharge noted on the posterior pharyngeal wall    Eyes: Conjunctivae and lids are normal. Pupils are equal, round, and reactive to light. Lids are everted and swept, no foreign bodies found. Extraocular movement intact vision grossly intact gaze aligned appropriately   Neck: Neck supple.   Cardiovascular: Normal rate, regular rhythm, normal heart sounds and normal pulses.   Pulmonary/Chest: Effort normal. No respiratory distress. She has wheezes. She has no rhonchi. She has no rales.   Abdominal: Normal appearance.   Musculoskeletal: Normal range of motion.         General: Normal range of motion.      Cervical back: She exhibits no tenderness.   Lymphadenopathy:     She has no cervical adenopathy.   Neurological: She is alert and oriented to person, place, and time.   Skin: Skin is warm.   Psychiatric: Her behavior is normal. Mood, judgment and thought content normal.   Nursing note and vitals reviewed.      Assessment:     1. Acute bronchitis due to other specified organisms    2. Other cough    3. Nasal congestion    4. Wheezing      Patient presents with clinical exam findings and history consistent with above.      On  exam, patient is nontoxic appearing and vitals are stable.      Diagnostic testing results were reviewed and discussed with patient/guardian.   Tests ordered in clinic: None  Previous progress notes/admissions/labs and medications were reviewed.     Results for orders placed or performed in visit on 01/04/25   POCT Influenza A/B MOLECULAR    Collection Time: 01/04/25 12:50 PM   Result Value Ref Range    POC Molecular Influenza A Ag Positive (A) Negative    POC Molecular Influenza B Ag Negative Negative     Acceptable Yes    SARS Coronavirus 2 Antigen, POCT Manual Read    Collection Time: 01/04/25 12:50 PM   Result Value Ref Range    SARS Coronavirus 2 Antigen Negative Negative     Acceptable Yes        Plan:   Continue xyzal, flonase, promethazine-DM, robitussin Dm every 4 to 6 hours    Patient with wheezing heard on auscultation.  Room air pulse ox of 97%. Patient was administered breathing treatment (DUO-NEB) in clinic.Patient checked post breathing treatment with improved breath sounds to auscultation.repeat pulse was 98 to 100%. Counseled patient to continue albuterol inhaler every 4 hours prn SOB/wheezing.    Acute bronchitis due to other specified organisms  -     promethazine-dextromethorphan (PROMETHAZINE-DM) 6.25-15 mg/5 mL Syrp; Take 5 mLs by mouth every 6 (six) hours as needed (cough).  Dispense: 180 mL; Refill: 0  -     dexAMETHasone injection 10 mg  -     Ambulatory referral/consult to Internal Medicine  -     predniSONE (DELTASONE) 20 MG tablet; Take 1 tablet (20 mg total) by mouth 2 (two) times daily. for 3 days  Dispense: 6 tablet; Refill: 0    Other cough  -     promethazine-dextromethorphan (PROMETHAZINE-DM) 6.25-15 mg/5 mL Syrp; Take 5 mLs by mouth every 6 (six) hours as needed (cough).  Dispense: 180 mL; Refill: 0    Nasal congestion    Wheezing  -     dexAMETHasone injection 10 mg  -     ipratropium 0.02 % nebulizer solution 0.5 mg  -     albuterol nebulizer  "solution 2.5 mg  -     albuterol (PROVENTIL/VENTOLIN HFA) 90 mcg/actuation inhaler; Inhale 2 puffs into the lungs every 4 (four) hours as needed for Wheezing or Shortness of Breath.  Dispense: 18 g; Refill: 0  -     inhalation spacing device; Use as directed for inhalation.  Dispense: 1 each; Refill: 0  -     predniSONE (DELTASONE) 20 MG tablet; Take 1 tablet (20 mg total) by mouth 2 (two) times daily. for 3 days  Dispense: 6 tablet; Refill: 0                    1) See orders for this visit as documented in the electronic medical record.  2) Symptomatic therapy suggested: use acetaminophen/ibuprofen every 6-8 hours prn pain or fever, push fluids.   3) Call or return to clinic prn if these symptoms worsen or fail to improve as anticipated.    Discussed results/diagnosis/plan with patient in clinic.  We had shared decision making for patient's treatment. Patient verbalized understanding and in agreement with current treatment plan.     Patient was instructed to return for re-evaluation with urgent care or PCP for continued outpatient workup and management if symptoms do not improve/worsening symptoms. Strict ED versus clinic precautions given in depth.    Discharge and follow-up instructions given verbally/printed with the patient who expressed understanding. The instructions and results are also available on [facility].              Sofie "Joseph Leigh PA-C          Patient Instructions   Symptomatic care:  Make breathing easier: continue albuterol inhaler/nebulizer every 4h prn SOB  Control coughing: Promethazine-DM   Lower swelling in your airways: decadron injection   Treat infection: azithromycin   Control extra mucus: Guaifenesin 400 mg every 4h prn or 1200 mg every 12 hours sputum production.       Recommend oral antihistamine (claritin, zyrtec, allegra,xyzal),oral decongestant (pseudoephedrine)  for rhinorrhea/ear congestion <3 days if blood pressure is <130/80mmhg, steroid nasal spray (flonase), prescription " (promethazine-DM) at night due to drowsiness  /OTC cough medicine (Robitussin DM every 4 to 6 hours or Mucinex DM 12 hour),  Tylenol (Acetaminophen) and/or Motrin (Ibuprofen) as directed for control of pain and/or fever.      Please drink plenty of fluids.  Please get plenty of rest.  Nasal irrigation with a saline spray or Netti Pot like device per their directions is also recommended.  If you  smoke, please stop smoking.    To help ease a sore throat, you can:  Use a sore throat spray.  Suck on hard candy or throat lozenges.  Gargle with warm saltwater a few times each day. Mix of 1/4 teaspoon (1.25 grams) salt in 8 ounces (240 mL) of warm water.  Use a cool mist humidifier to help you breathe easier.    If you negative (-) for a COVID test today and you are continuing to have symptoms, it is recommended to repeat the test in 48 hours x 3. If you continue to be negative, you may return to school/work once you have improved symptoms and no fever for 24 hours without any medications. This applies to all viral illnesses.       Discussed prescriptions and over-the-counter medicines to help with patient's symptoms:  A steroid nose spray (flonase) and antihistamine nasal spray (azelastine) can help with a stuffy nose. It can also help with drainage down the back of your throat.  An antihistamine (loratadine,zyrtec,allegra, xyzal) can help with itching, sneezing, or runny nose.  An antihistamine eye drop can help with itchy eyes.  A decongestant (pseudoephedrine,  Phenylephrine, oxymetazoline aka afrin nasal spray) can help with a stuffy nose. Take <10 days for congestion and rhinorrhea. Once symptoms improve, proceed with loratadine/zyrtec once a day. These ingredients can keep you up all night, decrease appetite, feel jittery, and raise blood pressure with long term use.  OTC Coricidin can be used for patients with hypertension and palpitations because you cannot use ingredients such as pseudoephedrine and  phenylephrine for oral decongestants.  Coricidin HBP Cough & Cold (Chlorpheniramine/Dextromethorphan)  Coricidin HBP Maximum Strength Multi-Symptom Flu  (Acetaminophen,Dextromethorphan, Chlorpheniramine)  Coricidin HBP® Maximum Strength Cold & Flu Day/Night (Acetaminophen,Dextromethorphan, Doxylamine, Guaifenesin)  Coricidin HBP Chest Congestion & Cough (Dextromethorphan + Guaifenesin)    Medications that control cough are suppressants and expectorants. Suppressants are tessalon pearls and dextromethorphan. If you have a productive cough with sputum, you need an expectorant called guaifenesin. Dextromethorphan and Guaifenesin are active ingredients in many OTC cough/cold medications such as Dayquil/Nyquil, Mucinex, and Robitussin Mucus+Chest Congestion.            Common Cold Medicine Ingredients Cheat sheet  Acetaminophen (APAP) -pain reliever/fever reducer  Dextromethorphan - cough suppressant  Guaifenesin - expectorant/thins and loosens mucus  Phenylephrine - nasal decongestant  Diphenhydramine or Doxylamine succinate - antihistamine, helps you fall asleep  Promethazine or Brompheniramine - Prescription strength antihistamines    These OTC cold medications are safe to use if you do not have high blood pressure (hypertension) or palpitations.  DayQuil and NyQuil - Cough, Cold & Flu Relief LiquiCaps  DayQuil: Acetaminophen, Dextromethorphan, and Phenylephrine   NyQuil: Acetaminophen, Dextromethorphan, and Doxylamine  DayQuil and NyQuil SEVERE Maximum Strength Cough, Cold & Flu Relief LiquiCaps  DAYQUIL: Acetaminophen, Dextromethorphan, Guaifenesin, and Phenylephrine  NYQUIL: Acetaminophen, Dextromethorphan, Doxylamine, and Phenylephrine   Mucinex DM: Guaifenesin,Dextromethorphan  Mucinex Maximum Strength Sinus-Max® Pressure, Pain & Cough Liquid Gels: Acetaminophen/Dextromethorphan/ Guaifenesin/Phenylephrine       If not allergic, take Tylenol (Acetaminophen) 650 mg to  1 g every 6 hours as needed  and/or Motrin  (Ibuprofen) 600 to 800 mg every 6 hours as needed for fever or pain.        Please remember that you have received care at an urgent care today. Urgent cares are not emergency rooms and are not equipped to handle life threatening emergencies and cannot rule in or out certain medical conditions and you may be released before all of your medical problems are known or treated.     Please arrange follow up with your primary care physician or speciality clinic within 2-5 days if your signs and symptoms have not resolved or worsen.     Patient can call our Referral Hotline at [phone] to make an appointment.      Please return here or go to the Emergency Department for any concerns or worsening of condition.  Signs of infection. These include a fever of 100.4°F (38°C) or higher, chills, cough, more sputum or change in color of sputum.  You are having so much trouble breathing that you can only say one or two words at a time.  You need to sit upright at all times to be able to breathe and or cannot lie down.  You have trouble breathing when talking or sitting still.  You have a fever of 100.4°F (38°C) or higher or chills.  You have chest pain when you cough, have trouble breathing but can still talk in full sentences, or cough up blood.

## 2025-02-03 ENCOUNTER — HOSPITAL ENCOUNTER (OUTPATIENT)
Dept: RADIOLOGY | Facility: HOSPITAL | Age: 60
Discharge: HOME | End: 2025-02-03
Attending: INTERNAL MEDICINE
Payer: COMMERCIAL

## 2025-02-03 DIAGNOSIS — R05.3 COUGH, PERSISTENT: Primary | ICD-10-CM

## 2025-02-03 DIAGNOSIS — R05.3 COUGH, PERSISTENT: ICD-10-CM

## 2025-02-03 PROCEDURE — 70220 X-RAY EXAM OF SINUSES: CPT | Mod: 26,,, | Performed by: RADIOLOGY

## 2025-02-03 PROCEDURE — 71046 X-RAY EXAM CHEST 2 VIEWS: CPT | Mod: 26,,, | Performed by: RADIOLOGY

## 2025-02-03 PROCEDURE — 71046 X-RAY EXAM CHEST 2 VIEWS: CPT | Mod: TC,FY

## 2025-02-03 PROCEDURE — 70220 X-RAY EXAM OF SINUSES: CPT | Mod: TC,FY

## 2025-02-21 ENCOUNTER — HOSPITAL ENCOUNTER (OUTPATIENT)
Dept: RADIOLOGY | Facility: HOSPITAL | Age: 60
Discharge: HOME | End: 2025-02-21
Attending: INTERNAL MEDICINE
Payer: COMMERCIAL

## 2025-02-21 DIAGNOSIS — M81.0 OP (OSTEOPOROSIS): ICD-10-CM

## 2025-02-21 DIAGNOSIS — Z12.31 SCREENING MAMMOGRAM, ENCOUNTER FOR: ICD-10-CM

## 2025-02-21 PROCEDURE — 77067 SCR MAMMO BI INCL CAD: CPT | Mod: 26,,, | Performed by: RADIOLOGY

## 2025-02-21 PROCEDURE — 77063 BREAST TOMOSYNTHESIS BI: CPT | Mod: TC

## 2025-02-21 PROCEDURE — 77080 DXA BONE DENSITY AXIAL: CPT | Mod: 26,,, | Performed by: RADIOLOGY

## 2025-02-21 PROCEDURE — 77063 BREAST TOMOSYNTHESIS BI: CPT | Mod: 26,,, | Performed by: RADIOLOGY

## 2025-02-21 PROCEDURE — 77080 DXA BONE DENSITY AXIAL: CPT | Mod: TC

## 2025-05-06 ENCOUNTER — PATIENT MESSAGE (OUTPATIENT)
Dept: PODIATRY | Facility: CLINIC | Age: 60
End: 2025-05-06
Payer: COMMERCIAL

## 2025-05-09 ENCOUNTER — TELEPHONE (OUTPATIENT)
Dept: PODIATRY | Facility: CLINIC | Age: 60
End: 2025-05-09
Payer: COMMERCIAL

## 2025-05-09 ENCOUNTER — OFFICE VISIT (OUTPATIENT)
Dept: URGENT CARE | Facility: CLINIC | Age: 60
End: 2025-05-09
Payer: COMMERCIAL

## 2025-05-09 VITALS
HEART RATE: 78 BPM | TEMPERATURE: 98 F | OXYGEN SATURATION: 98 % | RESPIRATION RATE: 16 BRPM | HEIGHT: 66 IN | DIASTOLIC BLOOD PRESSURE: 84 MMHG | WEIGHT: 180 LBS | BODY MASS INDEX: 28.93 KG/M2 | SYSTOLIC BLOOD PRESSURE: 126 MMHG

## 2025-05-09 DIAGNOSIS — L03.90 CELLULITIS, UNSPECIFIED CELLULITIS SITE: Primary | ICD-10-CM

## 2025-05-09 RX ORDER — MUPIROCIN 20 MG/G
OINTMENT TOPICAL 2 TIMES DAILY
Qty: 30 G | Refills: 0 | Status: SHIPPED | OUTPATIENT
Start: 2025-05-09

## 2025-05-09 RX ORDER — CEPHALEXIN 500 MG/1
500 CAPSULE ORAL EVERY 8 HOURS
Qty: 15 CAPSULE | Refills: 0 | Status: SHIPPED | OUTPATIENT
Start: 2025-05-09 | End: 2025-05-14

## 2025-05-09 NOTE — TELEPHONE ENCOUNTER
Per Dr. Perez, Please call and advise patient to go to an [facility] urgent care today.     It did not have any clinic openings today.     They will be able to assess if this is an injury or gout and if she needs x-rays or antibiotics and provide immediate treatment.    Spoke with patient voiced understanding to conversation states she will go to urgent care to be treated.

## 2025-05-09 NOTE — PROGRESS NOTES
"Subjective:      Patient ID: Jamila Xiong is a 59 y.o. female.    Vitals:  height is 5' 6" (1.676 m) and weight is 81.6 kg (180 lb). Her oral temperature is 98 °F (36.7 °C). Her blood pressure is 126/84 and her pulse is 78. Her respiration is 16 and oxygen saturation is 98%.     Chief Complaint: R foot edema/ redness     59-year-old female presents to the clinic today with chief complaint of right foot redness and swelling. Symptoms started one day ago after she got home from shopping and have worsened today.  Denies any acute injury or trauma to the area. Denies any previous surgeries or injuries on affected area, but she does have a hx of polyneuropathy. Patient has tried putting some neosporin to area, taken tylenol and elevated area.  Pain is present.  Denies any abrasions or insect bites to the area. Denies fever, body aches, chest pain, shortness of breath, abdominal pain, N/V/D, or rashes.      Edema  This is a new problem. The current episode started in the past 7 days. Associated symptoms include joint swelling. Pertinent negatives include no abdominal pain, arthralgias, chest pain, chills, fever, headaches, myalgias, nausea, neck pain, rash or vomiting.       Constitution: Negative for activity change, chills and fever.   Neck: Negative for neck pain.   Cardiovascular:  Negative for chest pain.   Respiratory:  Negative for shortness of breath.    Gastrointestinal:  Negative for abdominal pain, nausea, vomiting and diarrhea.   Musculoskeletal:  Positive for pain and joint swelling. Negative for trauma, joint pain, abnormal ROM of joint, arthritis, gout, back pain, muscle cramps, muscle ache and history of spine disorder.   Skin:  Positive for erythema. Negative for rash, abrasion and bruising.   Neurological:  Negative for headaches.   Psychiatric/Behavioral:  Negative for nervous/anxious. The patient is not nervous/anxious.       Objective:     Physical Exam   Constitutional: She is oriented to person, " place, and time. She appears well-developed.   HENT:   Head: Normocephalic and atraumatic. Head is without abrasion, without contusion and without laceration.   Ears:   Right Ear: External ear normal.   Left Ear: External ear normal.   Nose: Nose normal.   Mouth/Throat: Oropharynx is clear and moist and mucous membranes are normal.   Eyes: Conjunctivae, EOM and lids are normal. Pupils are equal, round, and reactive to light.   Neck: Trachea normal and phonation normal. Neck supple.   Cardiovascular: Normal rate.   Pulses:       Dorsalis pedis pulses are 2+ on the right side.        Posterior tibial pulses are 2+ on the right side.   Pulmonary/Chest: Effort normal. No respiratory distress.   Musculoskeletal: Normal range of motion.         General: Normal range of motion.      Right foot: Plantar foot sensation: normal.        Feet:    Neurological: She is alert and oriented to person, place, and time.   Skin: Skin is warm, dry, intact and no rash. Capillary refill takes less than 2 seconds. erythema No abrasion, No burn, No bruising and No ecchymosis   Psychiatric: Her speech is normal and behavior is normal. Judgment and thought content normal.   Nursing note and vitals reviewed.      Assessment:     1. Cellulitis, unspecified cellulitis site        Plan:       Cellulitis, unspecified cellulitis site  -     cephALEXin (KEFLEX) 500 MG capsule; Take 1 capsule (500 mg total) by mouth every 8 (eight) hours. for 5 days  Dispense: 15 capsule; Refill: 0  -     mupirocin (BACTROBAN) 2 % ointment; Apply topically 2 (two) times daily.  Dispense: 30 g; Refill: 0

## 2025-05-09 NOTE — PATIENT INSTRUCTIONS
Please drink plenty of fluids.  Please get plenty of rest.  Please return here or go to the Emergency Department for any concerns or worsening of condition.  If you were prescribed antibiotics, please take them to completion.  If you were prescribed a narcotic medication, do not drive or operate heavy equipment or machinery while taking these medications.  Please return here in 1-3 days for a recheck of your wound.  If not allergic, please take over the counter Tylenol (Acetaminophen) and/or Motrin (Ibuprofen) as directed for control of pain and/or fever.  Please follow up with your primary care doctor or specialist as needed.  Follow up with podiatrist next week, please take progression pictures.     If you  smoke, please stop smoking.

## 2025-05-09 NOTE — TELEPHONE ENCOUNTER
Spoke with patient voice understanding to conversation provider will be notified via message to advise on swollen and red foot please allow her some time to respond.

## 2025-05-13 ENCOUNTER — OFFICE VISIT (OUTPATIENT)
Dept: PODIATRY | Facility: CLINIC | Age: 60
End: 2025-05-13
Payer: COMMERCIAL

## 2025-05-13 VITALS
SYSTOLIC BLOOD PRESSURE: 159 MMHG | HEIGHT: 66 IN | BODY MASS INDEX: 30.13 KG/M2 | HEART RATE: 74 BPM | DIASTOLIC BLOOD PRESSURE: 85 MMHG | WEIGHT: 187.5 LBS

## 2025-05-13 DIAGNOSIS — E11.9 ENCOUNTER FOR DIABETIC FOOT EXAM: ICD-10-CM

## 2025-05-13 DIAGNOSIS — E11.49 TYPE II DIABETES MELLITUS WITH NEUROLOGICAL MANIFESTATIONS: Primary | ICD-10-CM

## 2025-05-13 DIAGNOSIS — S90.811D ABRASION OF RIGHT FOOT, SUBSEQUENT ENCOUNTER: ICD-10-CM

## 2025-05-13 DIAGNOSIS — B35.1 ONYCHOMYCOSIS DUE TO DERMATOPHYTE: ICD-10-CM

## 2025-05-13 PROCEDURE — 3077F SYST BP >= 140 MM HG: CPT | Mod: CPTII,S$GLB,, | Performed by: PODIATRIST

## 2025-05-13 PROCEDURE — 3079F DIAST BP 80-89 MM HG: CPT | Mod: CPTII,S$GLB,, | Performed by: PODIATRIST

## 2025-05-13 PROCEDURE — 99214 OFFICE O/P EST MOD 30 MIN: CPT | Mod: S$GLB,,, | Performed by: PODIATRIST

## 2025-05-13 PROCEDURE — 3008F BODY MASS INDEX DOCD: CPT | Mod: CPTII,S$GLB,, | Performed by: PODIATRIST

## 2025-05-13 PROCEDURE — 99999 PR PBB SHADOW E&M-EST. PATIENT-LVL IV: CPT | Mod: PBBFAC,,, | Performed by: PODIATRIST

## 2025-05-13 PROCEDURE — 4010F ACE/ARB THERAPY RXD/TAKEN: CPT | Mod: CPTII,S$GLB,, | Performed by: PODIATRIST

## 2025-05-13 PROCEDURE — 1159F MED LIST DOCD IN RCRD: CPT | Mod: CPTII,S$GLB,, | Performed by: PODIATRIST

## 2025-05-13 NOTE — PROGRESS NOTES
Subjective:      Patient ID: Jamila Xiong is a 59 y.o. female.    Chief Complaint:   Follow-up (Redness to right foot top of toes) and Diabetes Mellitus (Tushar Pino MD 06/30/2020)    Jamila is a 59 y.o. female who presents to the clinic for evaluation and treatment of high risk feet. Jamila has a past medical history of Abnormal Pap smear, appendectomy (2000), and Hypertension. The patient's chief complaint is   Foot exam  Planning to have hip replacement surgery soon      Last seen 7/2024  Pt relates that she had a concern on her foot recently so... went to urgent care  5/9/25 and got antibiotics :  Overall improved    Feels like it started when she went to the mall. Wore clarks... no injury  Took shoe off at home and it was sore and swollen    Pt also relates she has been on oral lamisil for her nail fungus      Chief Complaint: R foot edema/ redness   59-year-old female presents to the clinic today with chief complaint of right foot redness and swelling. Symptoms started one day ago after she got home from shopping and have worsened today.  Denies any acute injury or trauma to the area. Denies any previous surgeries or injuries on affected area, but she does have a hx of polyneuropathy. Patient has tried putting some neosporin to area, taken tylenol and elevated area.  Pain is present.  Denies any abrasions or insect bites to the area. Denies fever, body aches, chest pain, shortness of breath, abdominal pain, N/V/D, or rashes.     Cellulitis, unspecified cellulitis site  -     cephALEXin (KEFLEX) 500 MG capsule; Take 1 capsule (500 mg total) by mouth every 8 (eight) hours. for 5 days  Dispense: 15 capsule; Refill: 0  -     mupirocin (BACTROBAN) 2 % ointment; Apply topically 2 (two) times daily.  Dispense: 30 g; Refill: 0            PCP: Tushar Pino MD     Date Last Seen by PCP:  4/14/25      Neurology 5/6/25:  Subjective   59-year-old female here for follow-up of diabetic peripheral neuropathy. Since last  "visit, she has continued gabapentin 800 t.i.d., which controls her symptoms well for the most part she will be having a right hip replacement soon. She is also having some left knee pain. At times, the tingling and burning in her feet is bothersome  Assessment and Plan  Jamila was seen today for follow-up.    Diagnoses and all orders for this visit:    Polyneuropathy associated with underlying disease (CMS/HCC)    1. Peripheral neuropathy: Cont  TID. She will discuss Qutenza with her podiatrist at an upcoming appointment    Return to clinic 12 months, sooner if problem    Pranav Lambert MD  2025     Past Medical History:   Diagnosis Date    Abnormal Pap smear     Hx of appendectomy     Hypertension      Past Surgical History:   Procedure Laterality Date     SECTION      DILATION AND CURETTAGE OF UTERUS      TUBAL LIGATION       Current Outpatient Medications on File Prior to Visit   Medication Sig Dispense Refill    allopurinoL (ZYLOPRIM) 300 MG tablet Take 300 mg by mouth.      BD MARIS 2ND GEN PEN NEEDLE 32 gauge x 5/32" Ndle SMARTSIG:Injection Daily      buPROPion (WELLBUTRIN SR) 150 MG TBSR 12 hr tablet Take 150 mg by mouth daily as needed.      cephALEXin (KEFLEX) 500 MG capsule Take 1 capsule (500 mg total) by mouth every 8 (eight) hours. for 5 days 15 capsule 0    fluticasone propionate (FLONASE) 50 mcg/actuation nasal spray SHAKE LIQUID AND USE 2 SPRAYS(100 MCG) IN EACH NOSTRIL DAILY 48 g 0    FREESTYLE LITE STRIPS Strp TEST ONCE D      gabapentin (NEURONTIN) 800 MG tablet Take 800 mg by mouth 3 (three) times daily.      glipiZIDE 5 MG TR24 Take 5 mg by mouth every morning.      inhalation spacing device Use as directed for inhalation. 1 each 0    JANUMET -1,000 mg TM24 Take 1 tablet by mouth.      LEVEMIR FLEXPEN 100 unit/mL (3 mL) InPn pen INJECT 20 UNITS INTO THE SKIN EVERY DAY      levocetirizine (XYZAL) 5 MG tablet TAKE 1 TABLET(5 MG) BY MOUTH EVERY EVENING 90 tablet 0    " levoFLOXacin (LEVAQUIN) 750 MG tablet Take 1 tablet (750 mg total) by mouth once daily. 7 tablet 0    lisinopriL (PRINIVIL,ZESTRIL) 40 MG tablet TAKE 1 TABLET PO DAILY      metoprolol succinate (TOPROL-XL) 50 MG 24 hr tablet Take 50 mg by mouth.      metoprolol tartrate (LOPRESSOR) 25 MG tablet Take 25 mg by mouth 2 (two) times daily.      mupirocin (BACTROBAN) 2 % ointment Apply topically 2 (two) times daily. 30 g 0    pravastatin (PRAVACHOL) 80 MG tablet TK 1 T PO QD  4    terbinafine HCL (LAMISIL) 1 % cream Apply topically Daily. To affected toenails. 15 g 3    albuterol (PROVENTIL/VENTOLIN HFA) 90 mcg/actuation inhaler Inhale 2 puffs into the lungs every 4 (four) hours as needed for Wheezing or Shortness of Breath. 18 g 0    econazole nitrate 1 % cream Apply topically 2 (two) times daily. 85 g 1     No current facility-administered medications on file prior to visit.     Review of patient's allergies indicates:  No Known Allergies    Review of Systems   Constitutional: Negative for chills, decreased appetite, fever, malaise/fatigue, night sweats, weight gain and weight loss.   Cardiovascular:  Positive for leg swelling. Negative for chest pain, claudication, dyspnea on exertion, palpitations and syncope.   Respiratory:  Negative for cough and shortness of breath.    Endocrine: Negative for cold intolerance and heat intolerance.   Hematologic/Lymphatic: Negative for bleeding problem. Does not bruise/bleed easily.   Skin:  Positive for color change. Negative for dry skin, flushing, itching, nail changes, poor wound healing, rash, skin cancer, suspicious lesions and unusual hair distribution.   Musculoskeletal:  Positive for joint swelling. Negative for arthritis, back pain, falls, gout, joint pain, muscle cramps, muscle weakness, myalgias, neck pain and stiffness.   Gastrointestinal:  Negative for diarrhea, nausea and vomiting.   Neurological:  Positive for numbness and paresthesias. Negative for dizziness, focal  "weakness, light-headedness, tremors, vertigo and weakness.   Psychiatric/Behavioral:  Negative for altered mental status and depression. The patient does not have insomnia.    Allergic/Immunologic: Negative.            Objective:       Vitals:    05/13/25 0831   BP: (!) 159/85   Pulse: 74   Weight: 85.1 kg (187 lb 8 oz)   Height: 5' 6" (1.676 m)   PainSc:   2   PainLoc: Foot   85.1 kg (187 lb 8 oz)     Physical Exam  Vitals reviewed.   Constitutional:       General: She is not in acute distress.     Appearance: She is well-developed. She is not ill-appearing, toxic-appearing or diaphoretic.      Comments:     Cardiovascular:      Pulses:           Dorsalis pedis pulses are 2+ on the right side and 2+ on the left side.        Posterior tibial pulses are 1+ on the right side and 1+ on the left side.      Comments: nonPitting edema   Musculoskeletal:         General: No tenderness.      Right lower leg: No edema.      Left lower leg: No edema.      Right ankle: Normal.      Right Achilles Tendon: Normal.      Left ankle: Normal.      Left Achilles Tendon: Normal.      Right foot: Deformity, bunion and prominent metatarsal heads present. No tenderness or bony tenderness.      Left foot: Deformity, bunion and prominent metatarsal heads present. No tenderness or bony tenderness.      Comments: No pain on palpation   Feet:      Right foot:      Protective Sensation: 10 sites tested.  0 sites sensed.      Skin integrity: No ulcer, blister, skin breakdown, erythema, warmth or callus.      Left foot:      Protective Sensation: 10 sites tested.  0 sites sensed.      Skin integrity: No ulcer, blister, skin breakdown, erythema, warmth or callus.      Comments:      Mild abrasion right medial 2nd digit.   No wheeping    No open lesions  Mild erythema dorsal aspect 2nd/3rd MTPJ   Skin:     General: Skin is warm and dry.      Capillary Refill: Capillary refill takes 2 to 3 seconds.      Coloration: Skin is not pale.      Findings: " No erythema or rash.   Neurological:      Mental Status: She is alert and oriented to person, place, and time.      Sensory: Sensory deficit present.      Gait: Gait abnormal.   Psychiatric:         Attention and Perception: Attention normal.         Mood and Affect: Mood normal.         Speech: Speech normal.         Behavior: Behavior normal.         Thought Content: Thought content normal.         Xray:  TECHNIQUE:  AP, lateral, and oblique views of the right foot were performed.     COMPARISON:  None     FINDINGS:  No acute fracture or dislocation. Alignment is normal. The Lisfranc articulation is congruent. There is joint space narrowing of the great toe metatarsophalangeal joint with marginal osteophytes.  There are vascular calcifications.  There is no radiopaque foreign body in the field.     Impression:     No radiopaque foreign body.        Electronically signed by:Moses Garrett  Date:                                            06/16/2024  Time:                                           22:47    CT Foot With Contrast Right  Order: [id number]  Status: Final result       Visible to patient: Yes (seen)       Next appt: 06/28/2024 at 08:30 AM in Podiatry (Elaine Leigh DPM)    0 Result Notes  Details    Reading Physician Reading Date Result Priority   Moses Garrett, DO  [phone] 6/17/2024 STAT     Narrative & Impression  EXAMINATION:  CT FOOT WITH CONTRAST RIGHT     CLINICAL HISTORY:  cellulitis, r/o gasforming infection;     TECHNIQUE:  Axial CT images of the right foot with sagittal and coronal reformats after the intravenous administration of 100 mL Omnipaque 350.     COMPARISON:  Radiographs from 06/16/2024.     FINDINGS:  Bones are intact.  There is no acute fracture or dislocation.  Alignment is normal.  There is no CT evidence of acute osteomyelitis.  There is joint space narrowing of the great toe metatarsophalangeal joint, mild, with marginal osteophytes.  There is a small cutaneous soft  tissue defect of the lateral aspect of the foot, overlying the 5th metacarpophalangeal joint, with mild underlying soft tissue edema.  There is no evidence of soft tissue gas or evidence of a rim enhancing fluid collection or abscess.  There is no evidence of a radiopaque foreign body.  There is mild dorsal soft tissue edema of the forefoot.  There is moderate fatty atrophy of the visualized musculature.  There are vascular calcifications.     Impression:     Small soft tissue defect/laceration overlying the 5th metatarsophalangeal joint with mild underlying soft tissue edema, correlate for cellulitis.  No abscess, soft tissue gas, or CT evidence of acute osteomyelitis.        Electronically signed by:Moses Garrett  Date:                                            06/17/2024  Time:                                           01:57                  Narrative & Impression  EXAMINATION:  US LOWER EXTREMITY ARTERIES BILATERAL     CLINICAL HISTORY:  Unspecified atherosclerosis of native arteries of extremities, bilateral legs     TECHNIQUE:  Bilateral lower extremity arterial duplex ultrasound examination performed. Multiple gray scale and color doppler images were obtained in addition to waveform analysis.  Ankle-brachial indices were calculated.     COMPARISON:  Bilateral lower extremity arterial ultrasound dated 12/17/2014     FINDINGS:  The peak systolic velocities on the right are as follows, in centimeters/second:     Common femoral artery: 167, triphasic     Deep profundus: 52, biphasic     Superficial femoral artery, proximal: 142, triphasic     Superficial femoral artery, mid portion: 145, triphasic     Superficial femoral artery, distal: 101, triphasic     Popliteal artery: 86, triphasic     Distal popliteal artery: 100, biphasic     Anterior tibial artery: 60, biphasic     Posterior tibial artery: 64, biphasic     Other: Mildly complex cystic structure at the left popliteal region extending to the proximal  medial calf suggesting Baker's cyst measuring 7.9 x 1.7 x 4.1 cm.     The peak systolic velocities on the left are as follows, in centimeters/second:     Common femoral artery: 125, triphasic     Deep profundus: 50, biphasic     Superficial femoral artery, proximal: 113, triphasic     Superficial femoral artery, mid portion: 100, triphasic     Superficial femoral artery, distal: 149, triphasic     Popliteal artery: 85, biphasic     Distal popliteal artery: 71, triphasic     Anterior tibial artery: 53, biphasic     Posterior tibial artery: 49, triphasic     Other: None     Impression:     Velocity parameters as above.  No abnormal velocity doubling to suggest hemodynamically significant stenosis.     Mildly complex cystic structure at the left popliteal region extending to the proximal medial calf suggesting Baker's cyst.        Electronically signed by:Nabor Urias  Date:                                            08/04/2023  Time:                                           12:11            Assessment:       Encounter Diagnoses   Name Primary?    Type II diabetes mellitus with neurological manifestations Yes    Encounter for diabetic foot exam     Abrasion of right foot, subsequent encounter                Plan:       Jamila was seen today for follow-up and diabetes mellitus.    Diagnoses and all orders for this visit:    Type II diabetes mellitus with neurological manifestations    Encounter for diabetic foot exam    Abrasion of right foot, subsequent encounter            I counseled the patient on her conditions, their implications and medical management.       DM foot exam    Chart review    With patient's permission, the toenails mentioned above were aggressively reduced and debrided using a nail nipper, removing all offending nail and debris.        The patient will continue to monitor the areas daily, inspect the feet, wear protective shoe gear when ambulatory, and moisturizer to maintain skin integrity.    Foot abrasion/erythema improving. Recommend lambswool as spacer    Recommend discussing with surgeron for hip about resolving infection bf surgery    F/u 4 months sooner if needed

## 2025-07-30 ENCOUNTER — TELEPHONE (OUTPATIENT)
Dept: OBSTETRICS AND GYNECOLOGY | Facility: CLINIC | Age: 60
End: 2025-07-30
Payer: COMMERCIAL

## 2025-08-04 ENCOUNTER — PATIENT MESSAGE (OUTPATIENT)
Dept: PODIATRY | Facility: CLINIC | Age: 60
End: 2025-08-04
Payer: COMMERCIAL

## 2025-08-21 ENCOUNTER — PATIENT MESSAGE (OUTPATIENT)
Dept: PODIATRY | Facility: CLINIC | Age: 60
End: 2025-08-21
Payer: COMMERCIAL

## 2025-08-26 ENCOUNTER — OFFICE VISIT (OUTPATIENT)
Dept: PODIATRY | Facility: CLINIC | Age: 60
End: 2025-08-26
Payer: COMMERCIAL

## 2025-08-26 VITALS
HEART RATE: 83 BPM | BODY MASS INDEX: 29.62 KG/M2 | WEIGHT: 184.31 LBS | SYSTOLIC BLOOD PRESSURE: 153 MMHG | DIASTOLIC BLOOD PRESSURE: 79 MMHG | HEIGHT: 66 IN

## 2025-08-26 DIAGNOSIS — E11.49 TYPE II DIABETES MELLITUS WITH NEUROLOGICAL MANIFESTATIONS: Primary | ICD-10-CM

## 2025-08-26 DIAGNOSIS — B35.1 ONYCHOMYCOSIS DUE TO DERMATOPHYTE: ICD-10-CM

## 2025-08-26 DIAGNOSIS — E11.9 ENCOUNTER FOR DIABETIC FOOT EXAM: ICD-10-CM

## 2025-08-26 DIAGNOSIS — Z86.31 H/O DIABETIC FOOT ULCER: ICD-10-CM

## 2025-08-26 PROCEDURE — 4010F ACE/ARB THERAPY RXD/TAKEN: CPT | Mod: CPTII,S$GLB,, | Performed by: PODIATRIST

## 2025-08-26 PROCEDURE — 3078F DIAST BP <80 MM HG: CPT | Mod: CPTII,S$GLB,, | Performed by: PODIATRIST

## 2025-08-26 PROCEDURE — 1160F RVW MEDS BY RX/DR IN RCRD: CPT | Mod: CPTII,S$GLB,, | Performed by: PODIATRIST

## 2025-08-26 PROCEDURE — 99999 PR PBB SHADOW E&M-EST. PATIENT-LVL IV: CPT | Mod: PBBFAC,,, | Performed by: PODIATRIST

## 2025-08-26 PROCEDURE — 1159F MED LIST DOCD IN RCRD: CPT | Mod: CPTII,S$GLB,, | Performed by: PODIATRIST

## 2025-08-26 PROCEDURE — 3008F BODY MASS INDEX DOCD: CPT | Mod: CPTII,S$GLB,, | Performed by: PODIATRIST

## 2025-08-26 PROCEDURE — 3077F SYST BP >= 140 MM HG: CPT | Mod: CPTII,S$GLB,, | Performed by: PODIATRIST

## 2025-08-26 PROCEDURE — 99214 OFFICE O/P EST MOD 30 MIN: CPT | Mod: S$GLB,,, | Performed by: PODIATRIST

## 2025-08-26 RX ORDER — APIXABAN 2.5 MG/1
2.5 TABLET, FILM COATED ORAL 2 TIMES DAILY
COMMUNITY

## 2025-08-26 RX ORDER — INSULIN GLARGINE 100 [IU]/ML
20 INJECTION, SOLUTION SUBCUTANEOUS
COMMUNITY
Start: 2025-04-21

## 2025-08-26 RX ORDER — HYALURONATE SODIUM, STABILIZED 88 MG/4 ML
SYRINGE (ML) INTRAARTICULAR
COMMUNITY
Start: 2025-04-15

## 2025-08-26 RX ORDER — CELECOXIB 200 MG/1
200 CAPSULE ORAL
COMMUNITY
Start: 2025-08-12

## 2025-08-26 RX ORDER — PIOGLITAZONE 30 MG/1
30 TABLET ORAL DAILY PRN
COMMUNITY
Start: 2025-08-07